# Patient Record
Sex: MALE | Race: WHITE | NOT HISPANIC OR LATINO | Employment: OTHER | URBAN - METROPOLITAN AREA
[De-identification: names, ages, dates, MRNs, and addresses within clinical notes are randomized per-mention and may not be internally consistent; named-entity substitution may affect disease eponyms.]

---

## 2025-02-05 ENCOUNTER — APPOINTMENT (OUTPATIENT)
Dept: RADIOLOGY | Facility: HOSPITAL | Age: 80
End: 2025-02-05
Payer: OTHER GOVERNMENT

## 2025-02-05 ENCOUNTER — HOSPITAL ENCOUNTER (EMERGENCY)
Facility: HOSPITAL | Age: 80
Discharge: HOME | End: 2025-02-05
Attending: EMERGENCY MEDICINE
Payer: OTHER GOVERNMENT

## 2025-02-05 VITALS
TEMPERATURE: 96.6 F | RESPIRATION RATE: 16 BRPM | BODY MASS INDEX: 39.25 KG/M2 | WEIGHT: 265 LBS | HEART RATE: 64 BPM | DIASTOLIC BLOOD PRESSURE: 50 MMHG | SYSTOLIC BLOOD PRESSURE: 147 MMHG | HEIGHT: 69 IN | OXYGEN SATURATION: 99 %

## 2025-02-05 DIAGNOSIS — L03.031 CELLULITIS OF TOE OF RIGHT FOOT: Primary | ICD-10-CM

## 2025-02-05 LAB
ALBUMIN SERPL BCP-MCNC: 3.8 G/DL (ref 3.4–5)
ALP SERPL-CCNC: 56 U/L (ref 33–136)
ALT SERPL W P-5'-P-CCNC: 20 U/L (ref 10–52)
ANION GAP SERPL CALC-SCNC: 11 MMOL/L (ref 10–20)
AST SERPL W P-5'-P-CCNC: 30 U/L (ref 9–39)
BASOPHILS # BLD AUTO: 0.02 X10*3/UL (ref 0–0.1)
BASOPHILS NFR BLD AUTO: 0.3 %
BILIRUB SERPL-MCNC: 0.4 MG/DL (ref 0–1.2)
BUN SERPL-MCNC: 32 MG/DL (ref 6–23)
CALCIUM SERPL-MCNC: 9.2 MG/DL (ref 8.6–10.3)
CHLORIDE SERPL-SCNC: 103 MMOL/L (ref 98–107)
CO2 SERPL-SCNC: 29 MMOL/L (ref 21–32)
CREAT SERPL-MCNC: 0.95 MG/DL (ref 0.5–1.3)
EGFRCR SERPLBLD CKD-EPI 2021: 81 ML/MIN/1.73M*2
EOSINOPHIL # BLD AUTO: 0.16 X10*3/UL (ref 0–0.4)
EOSINOPHIL NFR BLD AUTO: 2.3 %
ERYTHROCYTE [DISTWIDTH] IN BLOOD BY AUTOMATED COUNT: 15.7 % (ref 11.5–14.5)
GLUCOSE SERPL-MCNC: 91 MG/DL (ref 74–99)
HCT VFR BLD AUTO: 34.1 % (ref 41–52)
HGB BLD-MCNC: 10.7 G/DL (ref 13.5–17.5)
IMM GRANULOCYTES # BLD AUTO: 0.01 X10*3/UL (ref 0–0.5)
IMM GRANULOCYTES NFR BLD AUTO: 0.1 % (ref 0–0.9)
LYMPHOCYTES # BLD AUTO: 1.66 X10*3/UL (ref 0.8–3)
LYMPHOCYTES NFR BLD AUTO: 24.3 %
MCH RBC QN AUTO: 28.8 PG (ref 26–34)
MCHC RBC AUTO-ENTMCNC: 31.4 G/DL (ref 32–36)
MCV RBC AUTO: 92 FL (ref 80–100)
MONOCYTES # BLD AUTO: 0.61 X10*3/UL (ref 0.05–0.8)
MONOCYTES NFR BLD AUTO: 8.9 %
NEUTROPHILS # BLD AUTO: 4.36 X10*3/UL (ref 1.6–5.5)
NEUTROPHILS NFR BLD AUTO: 64.1 %
NRBC BLD-RTO: 0 /100 WBCS (ref 0–0)
PLATELET # BLD AUTO: 143 X10*3/UL (ref 150–450)
POTASSIUM SERPL-SCNC: 4.6 MMOL/L (ref 3.5–5.3)
PROT SERPL-MCNC: 6.6 G/DL (ref 6.4–8.2)
RBC # BLD AUTO: 3.72 X10*6/UL (ref 4.5–5.9)
SODIUM SERPL-SCNC: 138 MMOL/L (ref 136–145)
WBC # BLD AUTO: 6.8 X10*3/UL (ref 4.4–11.3)

## 2025-02-05 PROCEDURE — 73620 X-RAY EXAM OF FOOT: CPT | Mod: RT

## 2025-02-05 PROCEDURE — 99285 EMERGENCY DEPT VISIT HI MDM: CPT

## 2025-02-05 PROCEDURE — 2500000001 HC RX 250 WO HCPCS SELF ADMINISTERED DRUGS (ALT 637 FOR MEDICARE OP)

## 2025-02-05 PROCEDURE — 2500000001 HC RX 250 WO HCPCS SELF ADMINISTERED DRUGS (ALT 637 FOR MEDICARE OP): Performed by: EMERGENCY MEDICINE

## 2025-02-05 PROCEDURE — 85025 COMPLETE CBC W/AUTO DIFF WBC: CPT

## 2025-02-05 PROCEDURE — 80053 COMPREHEN METABOLIC PANEL: CPT

## 2025-02-05 PROCEDURE — 99284 EMERGENCY DEPT VISIT MOD MDM: CPT | Performed by: EMERGENCY MEDICINE

## 2025-02-05 PROCEDURE — 36415 COLL VENOUS BLD VENIPUNCTURE: CPT

## 2025-02-05 RX ORDER — DOXYCYCLINE 100 MG/1
100 CAPSULE ORAL ONCE
Status: COMPLETED | OUTPATIENT
Start: 2025-02-05 | End: 2025-02-05

## 2025-02-05 RX ORDER — SULFAMETHOXAZOLE AND TRIMETHOPRIM 800; 160 MG/1; MG/1
1 TABLET ORAL ONCE
Status: DISCONTINUED | OUTPATIENT
Start: 2025-02-05 | End: 2025-02-05

## 2025-02-05 RX ORDER — CEPHALEXIN 500 MG/1
500 CAPSULE ORAL 3 TIMES DAILY
Qty: 30 CAPSULE | Refills: 0 | Status: SHIPPED | OUTPATIENT
Start: 2025-02-05 | End: 2025-02-15

## 2025-02-05 RX ORDER — CIPROFLOXACIN 500 MG/1
750 TABLET ORAL ONCE
Status: DISCONTINUED | OUTPATIENT
Start: 2025-02-05 | End: 2025-02-05

## 2025-02-05 RX ORDER — CIPROFLOXACIN 750 MG/1
750 TABLET, FILM COATED ORAL 2 TIMES DAILY
Qty: 20 TABLET | Refills: 0 | Status: SHIPPED | OUTPATIENT
Start: 2025-02-05 | End: 2025-02-05 | Stop reason: WASHOUT

## 2025-02-05 RX ORDER — CEPHALEXIN 500 MG/1
500 CAPSULE ORAL ONCE
Status: COMPLETED | OUTPATIENT
Start: 2025-02-05 | End: 2025-02-05

## 2025-02-05 RX ORDER — DOXYCYCLINE 100 MG/1
100 CAPSULE ORAL 2 TIMES DAILY
Qty: 20 CAPSULE | Refills: 0 | Status: SHIPPED | OUTPATIENT
Start: 2025-02-05 | End: 2025-02-15

## 2025-02-05 RX ADMIN — CEPHALEXIN 500 MG: 500 CAPSULE ORAL at 14:42

## 2025-02-05 RX ADMIN — DOXYCYCLINE HYCLATE 100 MG: 100 CAPSULE ORAL at 14:42

## 2025-02-05 ASSESSMENT — LIFESTYLE VARIABLES
EVER HAD A DRINK FIRST THING IN THE MORNING TO STEADY YOUR NERVES TO GET RID OF A HANGOVER: NO
EVER FELT BAD OR GUILTY ABOUT YOUR DRINKING: NO
HAVE YOU EVER FELT YOU SHOULD CUT DOWN ON YOUR DRINKING: NO
TOTAL SCORE: 0
HAVE PEOPLE ANNOYED YOU BY CRITICIZING YOUR DRINKING: NO

## 2025-02-05 ASSESSMENT — PAIN - FUNCTIONAL ASSESSMENT
PAIN_FUNCTIONAL_ASSESSMENT: 0-10
PAIN_FUNCTIONAL_ASSESSMENT: 0-10

## 2025-02-05 ASSESSMENT — COLUMBIA-SUICIDE SEVERITY RATING SCALE - C-SSRS
1. IN THE PAST MONTH, HAVE YOU WISHED YOU WERE DEAD OR WISHED YOU COULD GO TO SLEEP AND NOT WAKE UP?: NO
6. HAVE YOU EVER DONE ANYTHING, STARTED TO DO ANYTHING, OR PREPARED TO DO ANYTHING TO END YOUR LIFE?: NO
2. HAVE YOU ACTUALLY HAD ANY THOUGHTS OF KILLING YOURSELF?: NO

## 2025-02-05 ASSESSMENT — PAIN SCALES - GENERAL
PAINLEVEL_OUTOF10: 4
PAINLEVEL_OUTOF10: 3
PAINLEVEL_OUTOF10: 3

## 2025-02-05 ASSESSMENT — PAIN DESCRIPTION - ORIENTATION: ORIENTATION: RIGHT

## 2025-02-05 ASSESSMENT — PAIN DESCRIPTION - LOCATION: LOCATION: TOE (COMMENT WHICH ONE)

## 2025-02-05 NOTE — ED PROVIDER NOTES
EMERGENCY DEPARTMENT ENCOUNTER      Pt Name: Sanya Crowley  MRN: [id number]  Birthdate 1945  Date of evaluation: 2/5/2025  Provider: Aki Lloyd DO    CHIEF COMPLAINT       Chief Complaint   Patient presents with    Wound Check     R 2nd toe; injured 2 weeks ago from rubbing on boot when shoveling snow          HISTORY OF PRESENT ILLNESS    Patient is a 79-year-old male with a PMH of DM and HLD  presenting to the ED due to swelling and tenderness of his right second digit.  States that 2 weeks ago he was shoveling snow and his toe was rubbing against the top of his boot and it created an abrasion.  He put a Band-Aid on it and did not have any further issues.  Then about 2 days ago he noticed that it was getting a little bit more red than it had been previously.  And last night  it became even more erythematous and was painful when the top sheet touched it while he was in bed.  It had previously not been painful at all.  He denies any headache, chest pain, shortness of breath, fever, or chills.  He has not tried anything for the pain.      History provided by:  Patient   used: No        Nursing Notes were reviewed.    PAST MEDICAL HISTORY   History reviewed. No pertinent past medical history.      SURGICAL HISTORY     History reviewed. No pertinent surgical history.      CURRENT MEDICATIONS       Discharge Medication List as of 2/5/2025  2:37 PM          ALLERGIES     Patient has no known allergies.    FAMILY HISTORY     No family history on file.       SOCIAL HISTORY       Social History     Socioeconomic History    Marital status:    Tobacco Use    Smoking status: Never    Smokeless tobacco: Never   Substance and Sexual Activity    Drug use: Never       SCREENINGS                        PHYSICAL EXAM    (up to 7 for level 4, 8 or more for level 5)     ED Triage Vitals   Temperature Heart Rate Respirations BP   02/05/25 1037 02/05/25 1037 02/05/25 1037 02/05/25 1037   35.9 °C (96.6  °F) 60 18 171/72      Pulse Ox Temp Source Heart Rate Source Patient Position   02/05/25 1037 02/05/25 1037 02/05/25 1226 02/05/25 1226   100 % Temporal Monitor Sitting      BP Location FiO2 (%)     02/05/25 1226 --     Right arm        Physical Exam  Vitals reviewed.   Constitutional:       General: He is not in acute distress.     Appearance: Normal appearance. He is not ill-appearing or toxic-appearing.   HENT:      Head: Normocephalic and atraumatic.   Cardiovascular:      Rate and Rhythm: Normal rate.   Pulmonary:      Effort: Pulmonary effort is normal.   Abdominal:      General: Abdomen is flat.   Musculoskeletal:      Right lower leg: No edema.      Left lower leg: No edema.      Right foot: Normal range of motion and normal capillary refill. Swelling present. Normal pulse.      Left foot: Normal.      Comments: 2 R digit diffusely erythematous and swollen. 1 cm scab at DIP joint    Skin:     General: Skin is warm and dry.      Capillary Refill: Capillary refill takes less than 2 seconds.   Neurological:      General: No focal deficit present.      Mental Status: He is alert.   Psychiatric:         Mood and Affect: Mood normal.         Behavior: Behavior normal.          DIAGNOSTIC RESULTS     LABS:  Labs Reviewed   CBC WITH AUTO DIFFERENTIAL - Abnormal       Result Value    WBC 6.8      nRBC 0.0      RBC 3.72 (*)     Hemoglobin 10.7 (*)     Hematocrit 34.1 (*)     MCV 92      MCH 28.8      MCHC 31.4 (*)     RDW 15.7 (*)     Platelets 143 (*)     Neutrophils % 64.1      Immature Granulocytes %, Automated 0.1      Lymphocytes % 24.3      Monocytes % 8.9      Eosinophils % 2.3      Basophils % 0.3      Neutrophils Absolute 4.36      Immature Granulocytes Absolute, Automated 0.01      Lymphocytes Absolute 1.66      Monocytes Absolute 0.61      Eosinophils Absolute 0.16      Basophils Absolute 0.02     COMPREHENSIVE METABOLIC PANEL - Abnormal    Glucose 91      Sodium 138      Potassium 4.6      Chloride 103       Bicarbonate 29      Anion Gap 11      Urea Nitrogen 32 (*)     Creatinine 0.95      eGFR 81      Calcium 9.2      Albumin 3.8      Alkaline Phosphatase 56      Total Protein 6.6      AST 30      Bilirubin, Total 0.4      ALT 20         All other labs were within normal range or not returned as of this dictation.    Imaging  XR foot right 1-2 views   Final Result   No acute fracture or dislocation of the right foot is noted.             MACRO:   None        Signed by: Juancarlos Phillips 2/5/2025 1:05 PM   Dictation workstation:   [id number]           Procedures  Please see separate procedure documentation for further details.     EMERGENCY DEPARTMENT COURSE/MDM:   Medical Decision Making  Patient is a 79-year-old man with a PMH of DM and HLD that presents to the ED with erythema and swelling to the right second digit.  He is hemodynamically stable and vital signs within normal limits.  Stated that about 2 weeks ago when he was shoveling snow his toe was rubbing against the top of his boot which caused it to bleed.  He covered with a Band-Aid and did not have any further issues with it.  2 days ago he noticed that it was getting more red and yesterday it became painful.  He denies any fevers or chills.  On exam he appears generally well.  Pulses are intact.  Cap refill is less than 2 seconds.  Full strength and range of motion to the right foot.  No pain with range of motion. Foot x-ray does not show any signs of infection, fracture, or dislocation.  CBC does not show any signs of leukocytosis making any sort of systemic infection unlikely. CMP did not show any electrolyte related abnormality or any acute kidney injury.  He does not need admission at this time and a local cellulitis can be treated with outpatient antibiotics.  He will be given a dose of Keflex and doxycycline here in the ED. he was sent home with a prescription for the same antibiotic.  Discharge and return precautions were discussed with the patient  and he was agreeable to the plan.    I have seen and evaluated the patient and agree with the medical student's documentation as above.  I have edited and made adjustments as needed.  Plan of care was discussed with the attending physician.    Please see ED course for the rest of the MDM.    Aki Lloyd DO, PGY-3  Emergency Medicine    Amount and/or Complexity of Data Reviewed  External Data Reviewed:      Details: [facility] that showed [facility] dx of DM and HLD  Labs: ordered. Decision-making details documented in ED Course.  Radiology: ordered. Decision-making details documented in ED Course.        Please see ED course for additional MDM.    ED Course as of 02/05/25 1607   Wed Feb 05, 2025   1317 X-ray of the foot is unremarkable. [CL]   1317 CBC overall unremarkable.  No evidence of leukocytosis. [CL]      ED Course User Index  [CL] Aki Lloyd DO         Diagnoses as of 02/05/25 1607   Cellulitis of toe of right foot        XR foot right 1-2 views   Final Result   No acute fracture or dislocation of the right foot is noted.             MACRO:   None        Signed by: Juancarlos Phillips 2/5/2025 1:05 PM   Dictation workstation:   [id number]          Patient and or family in agreement and understanding of treatment plan.  All questions answered.      I reviewed the case with the attending ED physician. The attending ED physician agrees with the plan. Patient and/or patient´s representative was counseled regarding labs, imaging, likely diagnosis, and plan. All questions were answered.    ED Medications administered this visit:    Medications   cephalexin (Keflex) capsule 500 mg (500 mg oral Given 2/5/25 1442)   doxycycline (Vibramycin) capsule 100 mg (100 mg oral Given 2/5/25 1442)       New Prescriptions from this visit:    Discharge Medication List as of 2/5/2025  2:37 PM        START taking these medications    Details   cephalexin (Keflex) 500 mg capsule Take 1 capsule (500 mg) by mouth 3 times a  day for 10 days., Starting Wed 2/5/2025, Until Sat 2/15/2025, Normal      doxycycline (Vibramycin) 100 mg capsule Take 1 capsule (100 mg) by mouth 2 times a day for 10 days. Take with at least 8 ounces (large glass) of water, do not lie down for 30 minutes after, Starting Wed 2/5/2025, Until Sat 2/15/2025, Normal             Follow-up:  [facility]  [address] [address] [address] [facility] [address] [facility] [address] [phone]            Final Impression:   1. Cellulitis of toe of right foot          (Please note that portions of this note were completed with a voice recognition program.  Efforts were made to edit the dictations but occasionally words are mis-transcribed.)       Aki Lloyd,   Resident  02/05/25 [id number]

## 2025-02-05 NOTE — DISCHARGE INSTRUCTIONS
You have been evaluated in the Emergency Department today for a skin infection. If the area of inflammation was outlined today in the ER, please return to the ER immediately if the area of redness increases beyond that border. Please take your prescribed antibiotics as directed for the full course of the medication.    We recommend you take 600mg ibuprofen every 6 hours or tylenol 650mg every 6 hours as needed for pain. If needed, you can alternate these medications so that you take one medication every 3 hours. For instance, at noon take ibuprofen, then at 3pm take tylenol, then at 6pm take ibuprofen.    Please schedule an appointment for follow up with your primary care physician as soon as possible.    Return to the Emergency Department if you experience recurrent vomiting, fevers greater than 100.4F, increase in area of redness, warmth around the area, foul smelling discharge from the area, increased tenderness around the area, or any other concerning symptoms.    Thank you for choosing us for your care. yue

## 2025-02-25 ENCOUNTER — HOSPITAL ENCOUNTER (INPATIENT)
Facility: HOSPITAL | Age: 80
LOS: 2 days | Discharge: HOME | End: 2025-02-27
Attending: EMERGENCY MEDICINE | Admitting: HOSPITALIST
Payer: MEDICARE

## 2025-02-25 ENCOUNTER — APPOINTMENT (OUTPATIENT)
Dept: RADIOLOGY | Facility: HOSPITAL | Age: 80
End: 2025-02-25
Payer: MEDICARE

## 2025-02-25 ENCOUNTER — APPOINTMENT (OUTPATIENT)
Dept: CARDIOLOGY | Facility: HOSPITAL | Age: 80
End: 2025-02-25
Payer: MEDICARE

## 2025-02-25 DIAGNOSIS — R42 DIZZINESS: ICD-10-CM

## 2025-02-25 DIAGNOSIS — R00.1 BRADYCARDIA: ICD-10-CM

## 2025-02-25 DIAGNOSIS — R47.81 SLURRED SPEECH: ICD-10-CM

## 2025-02-25 DIAGNOSIS — L03.031 CELLULITIS OF TOE OF RIGHT FOOT: ICD-10-CM

## 2025-02-25 DIAGNOSIS — Z95.0 PACEMAKER: ICD-10-CM

## 2025-02-25 DIAGNOSIS — R55 POSTURAL DIZZINESS WITH NEAR SYNCOPE: ICD-10-CM

## 2025-02-25 DIAGNOSIS — I44.1 AV BLOCK, MOBITZ 2: ICD-10-CM

## 2025-02-25 DIAGNOSIS — R42 POSTURAL DIZZINESS WITH NEAR SYNCOPE: ICD-10-CM

## 2025-02-25 DIAGNOSIS — I49.9 ARRHYTHMIA: Primary | ICD-10-CM

## 2025-02-25 PROBLEM — E78.5 HYPERLIPIDEMIA: Status: ACTIVE | Noted: 2025-02-25

## 2025-02-25 PROBLEM — I87.2 CHRONIC VENOUS INSUFFICIENCY: Status: ACTIVE | Noted: 2025-02-25

## 2025-02-25 PROBLEM — E11.9 DIABETES TYPE 2, CONTROLLED (MULTI): Status: ACTIVE | Noted: 2025-02-25

## 2025-02-25 PROBLEM — E66.01 MORBID OBESITY (MULTI): Status: ACTIVE | Noted: 2025-02-25

## 2025-02-25 LAB
ALBUMIN SERPL BCP-MCNC: 3.6 G/DL (ref 3.4–5)
ALP SERPL-CCNC: 67 U/L (ref 33–136)
ALT SERPL W P-5'-P-CCNC: 21 U/L (ref 10–52)
ANION GAP SERPL CALC-SCNC: 10 MMOL/L (ref 10–20)
APTT PPP: 34 SECONDS (ref 26–36)
AST SERPL W P-5'-P-CCNC: 28 U/L (ref 9–39)
BASOPHILS # BLD AUTO: 0.02 X10*3/UL (ref 0–0.1)
BASOPHILS NFR BLD AUTO: 0.4 %
BILIRUB SERPL-MCNC: 0.4 MG/DL (ref 0–1.2)
BNP SERPL-MCNC: 123 PG/ML (ref 0–99)
BUN SERPL-MCNC: 36 MG/DL (ref 6–23)
CALCIUM SERPL-MCNC: 9.2 MG/DL (ref 8.6–10.3)
CARDIAC TROPONIN I PNL SERPL HS: 4 NG/L (ref 0–20)
CARDIAC TROPONIN I PNL SERPL HS: 4 NG/L (ref 0–20)
CHLORIDE SERPL-SCNC: 105 MMOL/L (ref 98–107)
CO2 SERPL-SCNC: 29 MMOL/L (ref 21–32)
CREAT SERPL-MCNC: 0.98 MG/DL (ref 0.5–1.3)
D DIMER PPP FEU-MCNC: 552 NG/ML FEU
EGFRCR SERPLBLD CKD-EPI 2021: 78 ML/MIN/1.73M*2
EOSINOPHIL # BLD AUTO: 0.18 X10*3/UL (ref 0–0.4)
EOSINOPHIL NFR BLD AUTO: 3.5 %
ERYTHROCYTE [DISTWIDTH] IN BLOOD BY AUTOMATED COUNT: 15.5 % (ref 11.5–14.5)
FLUAV RNA RESP QL NAA+PROBE: NOT DETECTED
FLUBV RNA RESP QL NAA+PROBE: NOT DETECTED
GLUCOSE BLD MANUAL STRIP-MCNC: 106 MG/DL (ref 74–99)
GLUCOSE SERPL-MCNC: 108 MG/DL (ref 74–99)
HCT VFR BLD AUTO: 34.3 % (ref 41–52)
HGB BLD-MCNC: 10.9 G/DL (ref 13.5–17.5)
IMM GRANULOCYTES # BLD AUTO: 0.01 X10*3/UL (ref 0–0.5)
IMM GRANULOCYTES NFR BLD AUTO: 0.2 % (ref 0–0.9)
INR PPP: 1.1 (ref 0.9–1.1)
LACTATE SERPL-SCNC: 0.6 MMOL/L (ref 0.4–2)
LYMPHOCYTES # BLD AUTO: 1.8 X10*3/UL (ref 0.8–3)
LYMPHOCYTES NFR BLD AUTO: 35 %
MAGNESIUM SERPL-MCNC: 1.89 MG/DL (ref 1.6–2.4)
MCH RBC QN AUTO: 29.1 PG (ref 26–34)
MCHC RBC AUTO-ENTMCNC: 31.8 G/DL (ref 32–36)
MCV RBC AUTO: 92 FL (ref 80–100)
MONOCYTES # BLD AUTO: 0.51 X10*3/UL (ref 0.05–0.8)
MONOCYTES NFR BLD AUTO: 9.9 %
NEUTROPHILS # BLD AUTO: 2.63 X10*3/UL (ref 1.6–5.5)
NEUTROPHILS NFR BLD AUTO: 51 %
NRBC BLD-RTO: 0 /100 WBCS (ref 0–0)
PLATELET # BLD AUTO: 135 X10*3/UL (ref 150–450)
POTASSIUM SERPL-SCNC: 4.4 MMOL/L (ref 3.5–5.3)
PROT SERPL-MCNC: 6.6 G/DL (ref 6.4–8.2)
PROTHROMBIN TIME: 11.7 SECONDS (ref 9.8–12.4)
RBC # BLD AUTO: 3.75 X10*6/UL (ref 4.5–5.9)
SARS-COV-2 RNA RESP QL NAA+PROBE: NOT DETECTED
SODIUM SERPL-SCNC: 140 MMOL/L (ref 136–145)
WBC # BLD AUTO: 5.2 X10*3/UL (ref 4.4–11.3)

## 2025-02-25 PROCEDURE — 2500000001 HC RX 250 WO HCPCS SELF ADMINISTERED DRUGS (ALT 637 FOR MEDICARE OP): Performed by: HOSPITALIST

## 2025-02-25 PROCEDURE — 2060000001 HC INTERMEDIATE ICU ROOM DAILY

## 2025-02-25 PROCEDURE — 83605 ASSAY OF LACTIC ACID: CPT | Performed by: EMERGENCY MEDICINE

## 2025-02-25 PROCEDURE — 99291 CRITICAL CARE FIRST HOUR: CPT | Performed by: EMERGENCY MEDICINE

## 2025-02-25 PROCEDURE — 85730 THROMBOPLASTIN TIME PARTIAL: CPT | Performed by: EMERGENCY MEDICINE

## 2025-02-25 PROCEDURE — 84075 ASSAY ALKALINE PHOSPHATASE: CPT | Performed by: EMERGENCY MEDICINE

## 2025-02-25 PROCEDURE — 83880 ASSAY OF NATRIURETIC PEPTIDE: CPT | Performed by: EMERGENCY MEDICINE

## 2025-02-25 PROCEDURE — 70450 CT HEAD/BRAIN W/O DYE: CPT

## 2025-02-25 PROCEDURE — 85025 COMPLETE CBC W/AUTO DIFF WBC: CPT | Performed by: EMERGENCY MEDICINE

## 2025-02-25 PROCEDURE — 99223 1ST HOSP IP/OBS HIGH 75: CPT | Performed by: HOSPITALIST

## 2025-02-25 PROCEDURE — 93005 ELECTROCARDIOGRAM TRACING: CPT

## 2025-02-25 PROCEDURE — 2500000004 HC RX 250 GENERAL PHARMACY W/ HCPCS (ALT 636 FOR OP/ED): Performed by: EMERGENCY MEDICINE

## 2025-02-25 PROCEDURE — 85379 FIBRIN DEGRADATION QUANT: CPT | Performed by: EMERGENCY MEDICINE

## 2025-02-25 PROCEDURE — 87636 SARSCOV2 & INF A&B AMP PRB: CPT | Performed by: EMERGENCY MEDICINE

## 2025-02-25 PROCEDURE — 82947 ASSAY GLUCOSE BLOOD QUANT: CPT

## 2025-02-25 PROCEDURE — 84484 ASSAY OF TROPONIN QUANT: CPT | Performed by: EMERGENCY MEDICINE

## 2025-02-25 PROCEDURE — 36415 COLL VENOUS BLD VENIPUNCTURE: CPT | Performed by: EMERGENCY MEDICINE

## 2025-02-25 PROCEDURE — 85610 PROTHROMBIN TIME: CPT | Performed by: EMERGENCY MEDICINE

## 2025-02-25 PROCEDURE — 80053 COMPREHEN METABOLIC PANEL: CPT | Performed by: EMERGENCY MEDICINE

## 2025-02-25 PROCEDURE — 99285 EMERGENCY DEPT VISIT HI MDM: CPT | Mod: 25 | Performed by: EMERGENCY MEDICINE

## 2025-02-25 PROCEDURE — 83735 ASSAY OF MAGNESIUM: CPT | Performed by: EMERGENCY MEDICINE

## 2025-02-25 PROCEDURE — 71045 X-RAY EXAM CHEST 1 VIEW: CPT

## 2025-02-25 PROCEDURE — 70450 CT HEAD/BRAIN W/O DYE: CPT | Performed by: STUDENT IN AN ORGANIZED HEALTH CARE EDUCATION/TRAINING PROGRAM

## 2025-02-25 PROCEDURE — 71045 X-RAY EXAM CHEST 1 VIEW: CPT | Performed by: RADIOLOGY

## 2025-02-25 RX ORDER — ACETAMINOPHEN 500 MG
5 TABLET ORAL NIGHTLY PRN
Status: DISCONTINUED | OUTPATIENT
Start: 2025-02-25 | End: 2025-02-27 | Stop reason: HOSPADM

## 2025-02-25 RX ORDER — POLYETHYLENE GLYCOL 3350 17 G/17G
17 POWDER, FOR SOLUTION ORAL DAILY
Status: DISCONTINUED | OUTPATIENT
Start: 2025-02-25 | End: 2025-02-27 | Stop reason: HOSPADM

## 2025-02-25 RX ORDER — CEPHALEXIN 500 MG/1
500 CAPSULE ORAL 4 TIMES DAILY
COMMUNITY
Start: 2021-06-17

## 2025-02-25 RX ORDER — ONDANSETRON 4 MG/1
4 TABLET, FILM COATED ORAL EVERY 8 HOURS PRN
Status: DISCONTINUED | OUTPATIENT
Start: 2025-02-25 | End: 2025-02-27 | Stop reason: HOSPADM

## 2025-02-25 RX ORDER — INSULIN LISPRO 100 [IU]/ML
0-10 INJECTION, SOLUTION INTRAVENOUS; SUBCUTANEOUS
Status: DISCONTINUED | OUTPATIENT
Start: 2025-02-26 | End: 2025-02-27 | Stop reason: HOSPADM

## 2025-02-25 RX ORDER — ACETAMINOPHEN 325 MG/1
650 TABLET ORAL EVERY 4 HOURS PRN
Status: DISCONTINUED | OUTPATIENT
Start: 2025-02-25 | End: 2025-02-27 | Stop reason: HOSPADM

## 2025-02-25 RX ORDER — ONDANSETRON HYDROCHLORIDE 2 MG/ML
4 INJECTION, SOLUTION INTRAVENOUS EVERY 8 HOURS PRN
Status: DISCONTINUED | OUTPATIENT
Start: 2025-02-25 | End: 2025-02-27 | Stop reason: HOSPADM

## 2025-02-25 RX ORDER — ACETAMINOPHEN 650 MG/1
650 SUPPOSITORY RECTAL EVERY 4 HOURS PRN
Status: DISCONTINUED | OUTPATIENT
Start: 2025-02-25 | End: 2025-02-27 | Stop reason: HOSPADM

## 2025-02-25 RX ORDER — DOXYCYCLINE 100 MG/1
100 TABLET ORAL 2 TIMES DAILY
COMMUNITY

## 2025-02-25 RX ORDER — ENOXAPARIN SODIUM 100 MG/ML
40 INJECTION SUBCUTANEOUS EVERY 12 HOURS SCHEDULED
Status: DISCONTINUED | OUTPATIENT
Start: 2025-02-25 | End: 2025-02-27 | Stop reason: HOSPADM

## 2025-02-25 RX ORDER — ACETAMINOPHEN 160 MG/5ML
650 SOLUTION ORAL EVERY 4 HOURS PRN
Status: DISCONTINUED | OUTPATIENT
Start: 2025-02-25 | End: 2025-02-27 | Stop reason: HOSPADM

## 2025-02-25 RX ADMIN — Medication 5 MG: at 23:01

## 2025-02-25 RX ADMIN — SODIUM CHLORIDE 500 ML: 9 INJECTION, SOLUTION INTRAVENOUS at 16:36

## 2025-02-25 SDOH — SOCIAL STABILITY: SOCIAL INSECURITY: WITHIN THE LAST YEAR, HAVE YOU BEEN HUMILIATED OR EMOTIONALLY ABUSED IN OTHER WAYS BY YOUR PARTNER OR EX-PARTNER?: NO

## 2025-02-25 SDOH — ECONOMIC STABILITY: FOOD INSECURITY: WITHIN THE PAST 12 MONTHS, YOU WORRIED THAT YOUR FOOD WOULD RUN OUT BEFORE YOU GOT THE MONEY TO BUY MORE.: NEVER TRUE

## 2025-02-25 SDOH — ECONOMIC STABILITY: FOOD INSECURITY: WITHIN THE PAST 12 MONTHS, THE FOOD YOU BOUGHT JUST DIDN'T LAST AND YOU DIDN'T HAVE MONEY TO GET MORE.: NEVER TRUE

## 2025-02-25 SDOH — SOCIAL STABILITY: SOCIAL INSECURITY
WITHIN THE LAST YEAR, HAVE YOU BEEN RAPED OR FORCED TO HAVE ANY KIND OF SEXUAL ACTIVITY BY YOUR PARTNER OR EX-PARTNER?: NO

## 2025-02-25 SDOH — SOCIAL STABILITY: SOCIAL INSECURITY: HAVE YOU HAD ANY THOUGHTS OF HARMING ANYONE ELSE?: NO

## 2025-02-25 SDOH — SOCIAL STABILITY: SOCIAL INSECURITY
WITHIN THE LAST YEAR, HAVE YOU BEEN KICKED, HIT, SLAPPED, OR OTHERWISE PHYSICALLY HURT BY YOUR PARTNER OR EX-PARTNER?: NO

## 2025-02-25 SDOH — SOCIAL STABILITY: SOCIAL INSECURITY: WERE YOU ABLE TO COMPLETE ALL THE BEHAVIORAL HEALTH SCREENINGS?: YES

## 2025-02-25 SDOH — SOCIAL STABILITY: SOCIAL INSECURITY: ARE YOU OR HAVE YOU BEEN THREATENED OR ABUSED PHYSICALLY, EMOTIONALLY, OR SEXUALLY BY ANYONE?: NO

## 2025-02-25 SDOH — SOCIAL STABILITY: SOCIAL INSECURITY: DO YOU FEEL UNSAFE GOING BACK TO THE PLACE WHERE YOU ARE LIVING?: NO

## 2025-02-25 SDOH — ECONOMIC STABILITY: INCOME INSECURITY: IN THE PAST 12 MONTHS HAS THE ELECTRIC, GAS, OIL, OR WATER COMPANY THREATENED TO SHUT OFF SERVICES IN YOUR HOME?: NO

## 2025-02-25 SDOH — SOCIAL STABILITY: SOCIAL INSECURITY: WITHIN THE LAST YEAR, HAVE YOU BEEN AFRAID OF YOUR PARTNER OR EX-PARTNER?: NO

## 2025-02-25 SDOH — SOCIAL STABILITY: SOCIAL INSECURITY: ABUSE: ADULT

## 2025-02-25 SDOH — SOCIAL STABILITY: SOCIAL INSECURITY: ARE THERE ANY APPARENT SIGNS OF INJURIES/BEHAVIORS THAT COULD BE RELATED TO ABUSE/NEGLECT?: NO

## 2025-02-25 SDOH — SOCIAL STABILITY: SOCIAL INSECURITY: DO YOU FEEL ANYONE HAS EXPLOITED OR TAKEN ADVANTAGE OF YOU FINANCIALLY OR OF YOUR PERSONAL PROPERTY?: NO

## 2025-02-25 SDOH — SOCIAL STABILITY: SOCIAL INSECURITY: DOES ANYONE TRY TO KEEP YOU FROM HAVING/CONTACTING OTHER FRIENDS OR DOING THINGS OUTSIDE YOUR HOME?: NO

## 2025-02-25 SDOH — SOCIAL STABILITY: SOCIAL INSECURITY: HAVE YOU HAD THOUGHTS OF HARMING ANYONE ELSE?: NO

## 2025-02-25 SDOH — SOCIAL STABILITY: SOCIAL INSECURITY: HAS ANYONE EVER THREATENED TO HURT YOUR FAMILY OR YOUR PETS?: NO

## 2025-02-25 ASSESSMENT — LIFESTYLE VARIABLES
TOTAL SCORE: 0
HOW OFTEN DO YOU HAVE 6 OR MORE DRINKS ON ONE OCCASION: NEVER
HOW MANY STANDARD DRINKS CONTAINING ALCOHOL DO YOU HAVE ON A TYPICAL DAY: PATIENT DOES NOT DRINK
SKIP TO QUESTIONS 9-10: 1
EVER FELT BAD OR GUILTY ABOUT YOUR DRINKING: NO
AUDIT-C TOTAL SCORE: 0
HAVE YOU EVER FELT YOU SHOULD CUT DOWN ON YOUR DRINKING: NO
EVER HAD A DRINK FIRST THING IN THE MORNING TO STEADY YOUR NERVES TO GET RID OF A HANGOVER: NO
AUDIT-C TOTAL SCORE: 0
HAVE PEOPLE ANNOYED YOU BY CRITICIZING YOUR DRINKING: NO
HOW OFTEN DO YOU HAVE A DRINK CONTAINING ALCOHOL: NEVER

## 2025-02-25 ASSESSMENT — ACTIVITIES OF DAILY LIVING (ADL)
PATIENT'S MEMORY ADEQUATE TO SAFELY COMPLETE DAILY ACTIVITIES?: YES
WALKS IN HOME: INDEPENDENT
FEEDING YOURSELF: INDEPENDENT
ADEQUATE_TO_COMPLETE_ADL: YES
GROOMING: INDEPENDENT
HEARING - RIGHT EAR: FUNCTIONAL
BATHING: INDEPENDENT
DRESSING YOURSELF: NEEDS ASSISTANCE
TOILETING: INDEPENDENT
JUDGMENT_ADEQUATE_SAFELY_COMPLETE_DAILY_ACTIVITIES: YES
LACK_OF_TRANSPORTATION: NO
HEARING - LEFT EAR: FUNCTIONAL

## 2025-02-25 ASSESSMENT — COGNITIVE AND FUNCTIONAL STATUS - GENERAL
WALKING IN HOSPITAL ROOM: A LITTLE
PATIENT BASELINE BEDBOUND: NO
MOBILITY SCORE: 18
CLIMB 3 TO 5 STEPS WITH RAILING: A LITTLE
MOVING FROM LYING ON BACK TO SITTING ON SIDE OF FLAT BED WITH BEDRAILS: A LITTLE
DRESSING REGULAR LOWER BODY CLOTHING: A LITTLE
TURNING FROM BACK TO SIDE WHILE IN FLAT BAD: A LITTLE
DRESSING REGULAR UPPER BODY CLOTHING: A LITTLE
STANDING UP FROM CHAIR USING ARMS: A LITTLE
DAILY ACTIVITIY SCORE: 22
MOVING TO AND FROM BED TO CHAIR: A LITTLE

## 2025-02-25 ASSESSMENT — PATIENT HEALTH QUESTIONNAIRE - PHQ9
2. FEELING DOWN, DEPRESSED OR HOPELESS: NOT AT ALL
SUM OF ALL RESPONSES TO PHQ9 QUESTIONS 1 & 2: 0
1. LITTLE INTEREST OR PLEASURE IN DOING THINGS: NOT AT ALL

## 2025-02-25 ASSESSMENT — COLUMBIA-SUICIDE SEVERITY RATING SCALE - C-SSRS
1. IN THE PAST MONTH, HAVE YOU WISHED YOU WERE DEAD OR WISHED YOU COULD GO TO SLEEP AND NOT WAKE UP?: NO
2. HAVE YOU ACTUALLY HAD ANY THOUGHTS OF KILLING YOURSELF?: NO
6. HAVE YOU EVER DONE ANYTHING, STARTED TO DO ANYTHING, OR PREPARED TO DO ANYTHING TO END YOUR LIFE?: NO

## 2025-02-25 ASSESSMENT — PAIN SCALES - GENERAL
PAINLEVEL_OUTOF10: 0 - NO PAIN
PAINLEVEL_OUTOF10: 0 - NO PAIN

## 2025-02-25 ASSESSMENT — PAIN - FUNCTIONAL ASSESSMENT: PAIN_FUNCTIONAL_ASSESSMENT: 0-10

## 2025-02-25 NOTE — Clinical Note
Sheath was exchanged in the left cephalic vein with INTRODUCER, HYDROPHILIC, PRELUDE SNAP, 6 FR X 13 CM, GREEN. Over glidewire

## 2025-02-25 NOTE — Clinical Note
Updated wife per Dr. 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

## 2025-02-25 NOTE — Clinical Note
A venogram was performed on the right subclavian. Injected with hand injection. Via right peripheral IV

## 2025-02-25 NOTE — H&P
Chief complaint : Dizziness    history Of Present Illness  Sanya Crowley is a 79 y.o. male with history of morbid obesity, diabetes type 2, hyperlipidemia, chronic venous insufficiency, with right second toe wound who presented to emergency room with dizziness and some slurred speech.  Patient was seen at his PCP office today.  Patient was sent by his PCP for dizziness and they noticed the he has some slurred speech.  He was feeling off balance.  He did not have any upper extremity or lower extremity weakness.  He denies having headache.  His vision was a little blurry.  No syncopal episode.  No seizure activities.  No numbness tingling upper extremity or lower extremity.  No facial droops.  No previous history of stroke.  No history of coronary disease.  No history of stroke.  No history of coronary artery disease.  No history of sleep apnea.  He denies having nausea vomiting.  No chest pain.  No shortness of breath.  No orthopnea or PND.  He has been having a bilateral leg edema.  He denies having dysuria, hematuria, or frequency.       Past Medical History  Morbid obesity   HPL   DMII   Chronic venous insufficiency   Cellulitis   Foot culture      Surgical History  He has no past surgical history on file.     Social History  He reports that he has never smoked. He has never used smokeless tobacco. He reports that he does not use drugs. No history on file for alcohol use.    Family History  Noncontributory     Allergies  Patient has no known allergies.    Review of Systems   All other systems reviewed and are negative.       Physical Exam  Constitutional:       Appearance: Normal appearance.   HENT:      Head: Normocephalic and atraumatic.      Mouth/Throat:      Mouth: Mucous membranes are moist.   Eyes:      Extraocular Movements: Extraocular movements intact.      Pupils: Pupils are equal, round, and reactive to light.   Cardiovascular:      Rate and Rhythm: Normal rate and regular rhythm.   Pulmonary:       Effort: Pulmonary effort is normal.      Breath sounds: Normal breath sounds.   Abdominal:      General: Abdomen is flat.      Palpations: Abdomen is soft.   Musculoskeletal:      Cervical back: Normal range of motion and neck supple.   Skin:     General: Skin is warm.   Neurological:      General: No focal deficit present.      Mental Status: He is alert and oriented to person, place, and time.   Psychiatric:         Mood and Affect: Mood normal.         Behavior: Behavior normal.          Last Recorded Vitals  /65   Pulse (!) 42   Temp 36 °C (96.8 °F) (Temporal)   Resp 18   Wt 121 kg (267 lb)   SpO2 98%          Assessment/Plan     Sanya Crowley is a 79 y.o. male with history of morbid obesity, diabetes type 2, hyperlipidemia, chronic venous insufficiency, with right second toe wound who presented to emergency room with dizziness and some slurred speech.  Patient was seen at his PCP office today.  Patient was sent by his PCP for dizziness and they noticed the he has some slurred speech.  Assessment & Plan  Arrhythmia    AV block, Mobitz 2  Inpatient admission  Admit to stepdown  Cardiac telemetry  Bedside rest  Keep n.p.o. after midnight  Cardiac diet for now  Troponin x 3  Echocardiogram  EP consult  Serial EKG  Morbid obesity (Multi)  Lifestyle modification  Hyperlipidemia  Resume home medical  Diabetes type 2, controlled (Multi)  Accu-Chek ACHS  Insulin sliding scale  Hypoglycemia protocol    DVT prophylaxis Lovenox daily  Chronic venous insufficiency  Leg elevation   Compression stocking              Linda Brooks MD

## 2025-02-25 NOTE — Clinical Note
Sheath was exchanged in the right axillary vein with INTRODUCER, HYDROPHILIC, PRELUDE SNAP, 7 FR X 13 CM, ORANGE. X2 wire retained along side

## 2025-02-25 NOTE — ASSESSMENT & PLAN NOTE
Inpatient admission  Admit to stepdown  Cardiac telemetry  Bedside rest  Keep n.p.o. after midnight  Cardiac diet for now  Troponin x 3  Echocardiogram  EP consult  Serial EKG

## 2025-02-25 NOTE — Clinical Note
Everything out oriented to person, place and time , normal sensation , short and long term memory intact , oriented to person, place and time , normal sensation , short and long term memory intact

## 2025-02-25 NOTE — ED PROVIDER NOTES
79-year-old male presents emergency department with chief complaint of dizziness and slurred speech.  Patient reports he has had the symptoms for the past 2 weeks.  Patient also reports he was told his EKG was abnormal at his doctor's appointment today.  He states he had a doctor's appointment regarding a wound on his right second toe and they sent him here due to his reported symptoms of dizziness and they noticed his slurred speech as well.  Patient reports his dizziness as feeling off balance.  He does report this off-balance sensation caused him to fall into a doorway.  No fevers, coughing, or congestion.  Patient denies any chest pain or significant difficulty breathing.  No abdominal pain, nausea, vomiting, dysuria, diarrhea, constipation, black or bloody stools.  Patient denies any headache. Patient reports he is currently on antibiotic for his right toe wound.  Chart review shows history of hyperlipidemia, lumbar spondylosis, elevated BMI, diabetes, and venous insufficiency.  Patient is not on any anticoagulants.  Reports remote history of tobacco use.  Denies any illicit drug use or regular alcohol use.      History provided by:  Patient   used: No           ------------------------------------------------------------------------------------------------------------------------------------------    VS: As documented in the triage note and EMR flowsheet from this visit were reviewed.    Review of Systems  Constitutional: no fever, chills reports malaise and fatigue  Eyes: no redness, discharge, pain  HENT: no sore throat, nose bleeds, congestion, rhinorrhea   Cardiovascular: no chest pain, palpitations reports chronic lower extremity edema at baseline  Respiratory: no shortness of breath, cough, wheezing  GI: no nausea, diarrhea, pain, vomiting, constipation, BRBPR, melena  : no dysuria, frequency, hematuria  Musculoskeletal: no neck pain, stiffness,  no joint deformity, swelling  Skin:  no rash, erythema, reports right toe wound  Neurological: no headache, lightheadedness, weakness, numbness, or tingling admits to dizziness and slurred speech  Psychiatric: no suicidal thoughts, confusion, agitation  Metabolic: no polyuria or polydipsia  Hematologic: no increased bleeding or bruising  Immunology: No immunocompromise state    [facility]  Nursing notes reviewed and confirmed by me.  Chart review performed including medications, allergies, and medical, surgical, and family history  Visit Vitals  /63 (BP Location: Right arm, Patient Position: Lying)   Pulse 54   Temp 35.5 °C (95.9 °F) (Temporal)   Resp 16     Physical Exam  Vitals and nursing note reviewed.   Constitutional:       General: He is not in acute distress.     Appearance: Normal appearance. He is not ill-appearing.   HENT:      Head: Normocephalic and atraumatic.      Right Ear: External ear normal.      Left Ear: External ear normal.      Nose: Nose normal. No congestion or rhinorrhea.      Mouth/Throat:      Mouth: Mucous membranes are moist.      Pharynx: No oropharyngeal exudate or posterior oropharyngeal erythema.   Eyes:      Extraocular Movements: Extraocular movements intact.      Conjunctiva/sclera: Conjunctivae normal.      Pupils: Pupils are equal, round, and reactive to light.   Cardiovascular:      Rate and Rhythm: Regular rhythm. Bradycardia present.      Pulses: Normal pulses.      Heart sounds: Normal heart sounds.   Pulmonary:      Effort: Pulmonary effort is normal. No respiratory distress.      Breath sounds: Normal breath sounds. No stridor. No wheezing, rhonchi or rales.   Abdominal:      General: There is no distension.      Palpations: Abdomen is soft.      Tenderness: There is no abdominal tenderness. There is no guarding or rebound.   Musculoskeletal:         General: No swelling or deformity. Normal range of motion.      Cervical back: Normal range of motion and neck supple. No rigidity.      Right lower leg: Edema  present.      Left lower leg: Edema present.      Comments: No calf tenderness   Bilateral lower extremity edema 1+.  Slightly worse on the left when compared to the right.  Patient states this is chronic   Skin:     General: Skin is warm and dry.      Capillary Refill: Capillary refill takes less than 2 seconds.      Coloration: Skin is not jaundiced.      Findings: Erythema present. No rash.      Comments: Ulcerative wound on the dorsal aspect of the right second toe at the PIP joint.  Surrounding erythema and edema of the second toe.  No purulent drainage.   Neurological:      General: No focal deficit present.      Mental Status: He is alert and oriented to person, place, and time.      Sensory: No sensory deficit.      Motor: No weakness.      Comments: Patient appreciated to have slightly slurred speech.  No facial droop.  He has equal strength and sensation in upper and lower extremities.  Normal finger-to-nose testing.  NIH stroke scale is 1 for slurred speech.  Patient reports last known normal of at least 2 weeks ago.    Cranial nerves II through XII grossly intact.   Psychiatric:         Mood and Affect: Mood normal.         Behavior: Behavior normal.        No past medical history on file.   No past surgical history on file.   Social History     Socioeconomic History    Marital status:    Tobacco Use    Smoking status: Never    Smokeless tobacco: Never   Substance and Sexual Activity    Drug use: Never     Social Drivers of Health     Financial Resource Strain: Low Risk  (2/25/2025)    Overall Financial Resource Strain (CARDIA)     Difficulty of Paying Living Expenses: Not hard at all   Food Insecurity: No Food Insecurity (2/25/2025)    Hunger Vital Sign     Worried About Running Out of Food in the Last Year: Never true     Ran Out of Food in the Last Year: Never true   Transportation Needs: No Transportation Needs (2/25/2025)    PRAPARE - Transportation     Lack of Transportation (Medical): No      Lack of Transportation (Non-Medical): No   Intimate Partner Violence: Not At Risk (2/25/2025)    Humiliation, Afraid, Rape, and Kick questionnaire     Fear of Current or Ex-Partner: No     Emotionally Abused: No     Physically Abused: No     Sexually Abused: No   Housing Stability: Low Risk  (2/25/2025)    Housing Stability Vital Sign     Unable to Pay for Housing in the Last Year: No     Number of Times Moved in the Last Year: 0     Homeless in the Last Year: No      ------------------------------------------------------------------------------------------------------------------------------------------  XR chest 1 view   Final Result   No acute cardiopulmonary process radiographically.        MACRO:   None.        Signed by: Ruth Morales 2/25/2025 4:29 PM   Dictation workstation:   [id number]      CT head wo IV contrast   Final Result   No acute intracranial hemorrhage, mass effect, or CT apparent acute   infarct. Chronic microvascular ischemia and involutional changes.        Signed by: Aj Bergman 2/25/2025 4:27 PM   Dictation workstation:   [id number]      Transthoracic Echo (TTE) Complete    (Results Pending)      Labs Reviewed   CBC WITH AUTO DIFFERENTIAL - Abnormal       Result Value    WBC 5.2      nRBC 0.0      RBC 3.75 (*)     Hemoglobin 10.9 (*)     Hematocrit 34.3 (*)     MCV 92      MCH 29.1      MCHC 31.8 (*)     RDW 15.5 (*)     Platelets 135 (*)     Neutrophils % 51.0      Immature Granulocytes %, Automated 0.2      Lymphocytes % 35.0      Monocytes % 9.9      Eosinophils % 3.5      Basophils % 0.4      Neutrophils Absolute 2.63      Immature Granulocytes Absolute, Automated 0.01      Lymphocytes Absolute 1.80      Monocytes Absolute 0.51      Eosinophils Absolute 0.18      Basophils Absolute 0.02     COMPREHENSIVE METABOLIC PANEL - Abnormal    Glucose 108 (*)     Sodium 140      Potassium 4.4      Chloride 105      Bicarbonate 29      Anion Gap 10      Urea Nitrogen 36 (*)     Creatinine  0.98      eGFR 78      Calcium 9.2      Albumin 3.6      Alkaline Phosphatase 67      Total Protein 6.6      AST 28      Bilirubin, Total 0.4      ALT 21     B-TYPE NATRIURETIC PEPTIDE - Abnormal     (*)     Narrative:        <100 pg/mL - Heart failure unlikely  100-299 pg/mL - Intermediate probability of acute heart                  failure exacerbation. Correlate with clinical                  context and patient history.    >=300 pg/mL - Heart Failure likely. Correlate with clinical                  context and patient history.    BNP testing is performed using different testing methodology at [facility] than at other [facility] hospitals. Direct result comparisons should only be made within the same method.      D-DIMER, VTE EXCLUSION - Abnormal    D-Dimer, Quantitative VTE Exclusion 552 (*)     Narrative:     The VTE Exclusion D-Dimer assay is reported in ng/mL Fibrinogen Equivalent Units (FEU).    Per 's instructions for use, a value of less than 500 ng/mL (FEU) may help to exclude DVT or PE in outpatients when the assay is used with a clinical pretest probability assessment.(AE must utilize and document eCalc 'Wells Score Deep Vein Thrombosis Risk' for DVT exclusion only. Emergency Department should utilize  Guidelines for Emergency Department Use of the VTE Exclusion D-Dimer and Clinical Pretest probability assessment model for DVT or PE exclusion.)   POCT GLUCOSE - Abnormal    POCT Glucose 106 (*)    MAGNESIUM - Normal    Magnesium 1.89     PROTIME-INR - Normal    Protime 11.7      INR 1.1     APTT - Normal    aPTT 34      Narrative:     The APTT is no longer used for monitoring Unfractionated Heparin Therapy. For monitoring Heparin Therapy, use the Heparin Assay.   LACTATE - Normal    Lactate 0.6      Narrative:     Venipuncture immediately after or during the administration of Metamizole may lead to falsely low results. Testing should be performed immediately prior to  Metamizole dosing.   SARS-COV-2 AND INFLUENZA A/B PCR - Normal    Flu A Result Not Detected      Flu B Result Not Detected      Coronavirus 2019, PCR Not Detected      Narrative:     This assay is an FDA-cleared, in vitro diagnostic nucleic acid amplification test for the qualitative detection and differentiation of SARS CoV-2/ Influenza A/B from nasopharyngeal specimens collected from individuals with signs and symptoms of respiratory tract infections, and has been validated for use at . Negative results do not preclude COVID-19/ Influenza A/B infections and should not be used as the sole basis for diagnosis, treatment, or other management decisions. Testing for SARS CoV-2 is recommended only for patients who meet current clinical and/or epidemiological criteria defined by federal, state, or local public health directives.   SERIAL TROPONIN-INITIAL - Normal    Troponin I, High Sensitivity 4      Narrative:     Less than 99th percentile of normal range cutoff-  Female and children under 18 years old <14 ng/L; Male <21 ng/L: Negative  Repeat testing should be performed if clinically indicated.     Female and children under 18 years old 14-50 ng/L; Male 21-50 ng/L:  Consistent with possible cardiac damage and possible increased clinical   risk. Serial measurements may help to assess extent of myocardial damage.     >50 ng/L: Consistent with cardiac damage, increased clinical risk and  myocardial infarction. Serial measurements may help assess extent of   myocardial damage.      NOTE: Children less than 1 year old may have higher baseline troponin   levels and results should be interpreted in conjunction with the overall   clinical context.     NOTE: Troponin I testing is performed using a different   testing methodology at [facility] than at other   [facility]. Direct result comparisons should only   be made within the same method.   SERIAL TROPONIN, 1 HOUR -  Normal    Troponin I, High Sensitivity 4      Narrative:     Less than 99th percentile of normal range cutoff-  Female and children under 18 years old <14 ng/L; Male <21 ng/L: Negative  Repeat testing should be performed if clinically indicated.     Female and children under 18 years old 14-50 ng/L; Male 21-50 ng/L:  Consistent with possible cardiac damage and possible increased clinical   risk. Serial measurements may help to assess extent of myocardial damage.     >50 ng/L: Consistent with cardiac damage, increased clinical risk and  myocardial infarction. Serial measurements may help assess extent of   myocardial damage.      NOTE: Children less than 1 year old may have higher baseline troponin   levels and results should be interpreted in conjunction with the overall   clinical context.     NOTE: Troponin I testing is performed using a different   testing methodology at [facility] than at other   [facility]. Direct result comparisons should only   be made within the same method.   TROPONIN SERIES- (INITIAL, 1 HR)    Narrative:     The following orders were created for panel order Troponin I Series, High Sensitivity (0, 1 HR).  Procedure                               Abnormality         Status                     ---------                               -----------         ------                     Troponin I, High Sensiti...[[id number]]  Normal              Final result               Troponin, High Sensitivi...[[id number]]  Normal              Final result                 Please view results for these tests on the individual orders.   BASIC METABOLIC PANEL   POCT FINGERSTICK GLUCOSE        Medical Decision Making  EKG interpreted by ED physician: Sinus bradycardia with questionable secondary block.  Rate of 29.  No significant ST elevations or depressions.  No significant Q waves.  Poor R wave progression.  Normal axis.  Some baseline artifact.    Repeat EKG interpreted by ED physician: Sinus  bradycardia with first-degree AV block rate of 43.  KY prolonged at 230.  QRS and QTc are within normal range.  No significant ST elevations or depressions.  No significant Q waves.  Poor R wave progression.  Left axis deviation.    79-year-old male presents emergency department with reports of dizziness and slurred speech.  He also reportedly had abnormal EKG at his doctor's appointment today.  Patient reports the dizziness and slurred speech have been going on for the past 2 weeks.  He is not in the window for TNK though I do consider stroke is a possibility for his symptoms.  NIH stroke scale is a 1 for slightly slurred speech.  Otherwise I did not appreciate any focal neurologic deficits.  A thorough workup is obtained given his presenting symptoms.  Cardiac enzymes x 2 and EKG do not show acute ACS.  EKG did show significant sinus bradycardia as well as findings concerning for second-degree heart block.  I did discuss these EKGs with Dr. Kevin who recommended admission, bedrest, and EP consultation.  He does not feel that the patient requires emergent temporary pacer.  Patient has been hemodynamically stable here in the ED.  D-dimer is age-adjusted negative.  BNP is not significantly elevated patient does not have findings of decompensated heart failure.  CBC does not show significant leukocytosis and shows anemia at baseline.  CMP shows no significant metabolic abnormality.  COVID and flu testing are negative.  Head CT shows no acute intracranial process such as hemorrhage or mass effect.  Chest x-ray did not show any acute cardiopulmonary process such as pneumonia, pleural effusion, or pulmonary edema.  Patient treated with gentle fluid bolus.  Given his findings of significant bradycardia which I suspect is symptomatic I recommend admission for further evaluation and treatment.  Patient agreeable with this plan.  Case discussed with hospitalist on-call.       Diagnoses as of 02/26/25 0045   Arrhythmia    Dizziness   Slurred speech   Cellulitis of toe of right foot      1. Arrhythmia  Transthoracic Echo (TTE) Complete    Transthoracic Echo (TTE) Complete      2. Dizziness  Transthoracic Echo (TTE) Complete    Transthoracic Echo (TTE) Complete      3. Slurred speech        4. Cellulitis of toe of right foot        5. Postural dizziness with near syncope  Transthoracic Echo (TTE) Complete    Transthoracic Echo (TTE) Complete         Critical Care    Performed by: Mariusz Vazquez DO  Authorized by: Mariusz Vazquez DO    Critical care provider statement:     Critical care time (minutes):  32    Critical care time was exclusive of:  Separately billable procedures and treating other patients    Critical care was necessary to treat or prevent imminent or life-threatening deterioration of the following conditions: cardiac arrhythmia.    Critical care was time spent personally by me on the following activities:  Development of treatment plan with patient or surrogate, discussions with consultants, evaluation of patient's response to treatment, examination of patient, re-evaluation of patient's condition, pulse oximetry, ordering and review of radiographic studies, ordering and review of laboratory studies, ordering and performing treatments and interventions and review of old charts    Care discussed with: admitting provider         This note was dictated using dragon software and may contain errors related to dictation interpretation errors.      Mariusz Vazquez DO  02/26/25 0045

## 2025-02-26 ENCOUNTER — APPOINTMENT (OUTPATIENT)
Dept: CARDIOLOGY | Facility: HOSPITAL | Age: 80
End: 2025-02-26
Payer: MEDICARE

## 2025-02-26 PROBLEM — R00.1 BRADYCARDIA: Status: ACTIVE | Noted: 2025-02-25

## 2025-02-26 LAB
ANION GAP SERPL CALC-SCNC: 10 MMOL/L (ref 10–20)
AORTIC VALVE MEAN GRADIENT: 3 MMHG
AORTIC VALVE PEAK VELOCITY: 1.16 M/S
ATRIAL RATE: 468 BPM
AV PEAK GRADIENT: 5 MMHG
AVA (PEAK VEL): 2.19 CM2
AVA (VTI): 2.31 CM2
BUN SERPL-MCNC: 33 MG/DL (ref 6–23)
CALCIUM SERPL-MCNC: 9 MG/DL (ref 8.6–10.3)
CHLORIDE SERPL-SCNC: 107 MMOL/L (ref 98–107)
CO2 SERPL-SCNC: 28 MMOL/L (ref 21–32)
CREAT SERPL-MCNC: 0.94 MG/DL (ref 0.5–1.3)
EGFRCR SERPLBLD CKD-EPI 2021: 82 ML/MIN/1.73M*2
EJECTION FRACTION APICAL 4 CHAMBER: 57.8
EJECTION FRACTION: 55 %
GLUCOSE BLD MANUAL STRIP-MCNC: 156 MG/DL (ref 74–99)
GLUCOSE BLD MANUAL STRIP-MCNC: 69 MG/DL (ref 74–99)
GLUCOSE BLD MANUAL STRIP-MCNC: 92 MG/DL (ref 74–99)
GLUCOSE SERPL-MCNC: 68 MG/DL (ref 74–99)
HOLD SPECIMEN: NORMAL
HOLD SPECIMEN: NORMAL
LEFT ATRIUM VOLUME AREA LENGTH INDEX BSA: 26.3 ML/M2
LEFT VENTRICLE INTERNAL DIMENSION DIASTOLE: 5.05 CM (ref 3.5–6)
LEFT VENTRICULAR OUTFLOW TRACT DIAMETER: 2 CM
LV EJECTION FRACTION BIPLANE: 57 %
MITRAL VALVE E/A RATIO: 1.08
POTASSIUM SERPL-SCNC: 4.2 MMOL/L (ref 3.5–5.3)
PR INTERVAL: 168 MS
Q ONSET: 181 MS
QRS COUNT: 10 BEATS
QRS DURATION: 182 MS
QT INTERVAL: 508 MS
QTC CALCULATION(BAZETT): 508 MS
QTC FREDERICIA: 508 MS
R AXIS: -83 DEGREES
RIGHT VENTRICLE FREE WALL PEAK S': 9.28 CM/S
RIGHT VENTRICLE PEAK SYSTOLIC PRESSURE: 35.5 MMHG
SODIUM SERPL-SCNC: 141 MMOL/L (ref 136–145)
T AXIS: 47 DEGREES
T OFFSET: 435 MS
TRICUSPID ANNULAR PLANE SYSTOLIC EXCURSION: 1.6 CM
VENTRICULAR RATE: 60 BPM

## 2025-02-26 PROCEDURE — 36415 COLL VENOUS BLD VENIPUNCTURE: CPT | Performed by: HOSPITALIST

## 2025-02-26 PROCEDURE — 2500000004 HC RX 250 GENERAL PHARMACY W/ HCPCS (ALT 636 FOR OP/ED): Performed by: HOSPITALIST

## 2025-02-26 PROCEDURE — C1889 IMPLANT/INSERT DEVICE, NOC: HCPCS | Performed by: INTERNAL MEDICINE

## 2025-02-26 PROCEDURE — 2500000001 HC RX 250 WO HCPCS SELF ADMINISTERED DRUGS (ALT 637 FOR MEDICARE OP): Performed by: HOSPITALIST

## 2025-02-26 PROCEDURE — 75860 VEIN X-RAY NECK: CPT | Performed by: INTERNAL MEDICINE

## 2025-02-26 PROCEDURE — 0JH606Z INSERTION OF PACEMAKER, DUAL CHAMBER INTO CHEST SUBCUTANEOUS TISSUE AND FASCIA, OPEN APPROACH: ICD-10-PCS | Performed by: INTERNAL MEDICINE

## 2025-02-26 PROCEDURE — 82947 ASSAY GLUCOSE BLOOD QUANT: CPT

## 2025-02-26 PROCEDURE — 80048 BASIC METABOLIC PNL TOTAL CA: CPT | Performed by: HOSPITALIST

## 2025-02-26 PROCEDURE — 2500000001 HC RX 250 WO HCPCS SELF ADMINISTERED DRUGS (ALT 637 FOR MEDICARE OP): Performed by: STUDENT IN AN ORGANIZED HEALTH CARE EDUCATION/TRAINING PROGRAM

## 2025-02-26 PROCEDURE — C1785 PMKR, DUAL, RATE-RESP: HCPCS | Performed by: INTERNAL MEDICINE

## 2025-02-26 PROCEDURE — C1769 GUIDE WIRE: HCPCS | Performed by: INTERNAL MEDICINE

## 2025-02-26 PROCEDURE — 2500000002 HC RX 250 W HCPCS SELF ADMINISTERED DRUGS (ALT 637 FOR MEDICARE OP, ALT 636 FOR OP/ED): Performed by: HOSPITALIST

## 2025-02-26 PROCEDURE — C1894 INTRO/SHEATH, NON-LASER: HCPCS | Performed by: INTERNAL MEDICINE

## 2025-02-26 PROCEDURE — 2720000007 HC OR 272 NO HCPCS: Performed by: INTERNAL MEDICINE

## 2025-02-26 PROCEDURE — 2500000005 HC RX 250 GENERAL PHARMACY W/O HCPCS: Performed by: NURSE PRACTITIONER

## 2025-02-26 PROCEDURE — 7100000010 HC PHASE TWO TIME - EACH INCREMENTAL 1 MINUTE: Performed by: INTERNAL MEDICINE

## 2025-02-26 PROCEDURE — 99232 SBSQ HOSP IP/OBS MODERATE 35: CPT | Performed by: HOSPITALIST

## 2025-02-26 PROCEDURE — 7100000002 HC RECOVERY ROOM TIME - EACH INCREMENTAL 1 MINUTE: Performed by: INTERNAL MEDICINE

## 2025-02-26 PROCEDURE — 33208 INSRT HEART PM ATRIAL & VENT: CPT | Performed by: INTERNAL MEDICINE

## 2025-02-26 PROCEDURE — 2780000003 HC OR 278 NO HCPCS: Performed by: INTERNAL MEDICINE

## 2025-02-26 PROCEDURE — 99223 1ST HOSP IP/OBS HIGH 75: CPT | Performed by: INTERNAL MEDICINE

## 2025-02-26 PROCEDURE — 2750000001 HC OR 275 NO HCPCS: Performed by: INTERNAL MEDICINE

## 2025-02-26 PROCEDURE — 2500000004 HC RX 250 GENERAL PHARMACY W/ HCPCS (ALT 636 FOR OP/ED): Performed by: NURSE PRACTITIONER

## 2025-02-26 PROCEDURE — 02H63JZ INSERTION OF PACEMAKER LEAD INTO RIGHT ATRIUM, PERCUTANEOUS APPROACH: ICD-10-PCS | Performed by: INTERNAL MEDICINE

## 2025-02-26 PROCEDURE — 93010 ELECTROCARDIOGRAM REPORT: CPT | Performed by: INTERNAL MEDICINE

## 2025-02-26 PROCEDURE — 2060000001 HC INTERMEDIATE ICU ROOM DAILY

## 2025-02-26 PROCEDURE — 99152 MOD SED SAME PHYS/QHP 5/>YRS: CPT | Performed by: INTERNAL MEDICINE

## 2025-02-26 PROCEDURE — 2550000001 HC RX 255 CONTRASTS: Performed by: INTERNAL MEDICINE

## 2025-02-26 PROCEDURE — 93306 TTE W/DOPPLER COMPLETE: CPT | Performed by: INTERNAL MEDICINE

## 2025-02-26 PROCEDURE — 2500000004 HC RX 250 GENERAL PHARMACY W/ HCPCS (ALT 636 FOR OP/ED): Performed by: INTERNAL MEDICINE

## 2025-02-26 PROCEDURE — 7100000009 HC PHASE TWO TIME - INITIAL BASE CHARGE: Performed by: INTERNAL MEDICINE

## 2025-02-26 PROCEDURE — C1898 LEAD, PMKR, OTHER THAN TRANS: HCPCS | Performed by: INTERNAL MEDICINE

## 2025-02-26 PROCEDURE — 99153 MOD SED SAME PHYS/QHP EA: CPT | Performed by: INTERNAL MEDICINE

## 2025-02-26 PROCEDURE — 93005 ELECTROCARDIOGRAM TRACING: CPT

## 2025-02-26 PROCEDURE — C8929 TTE W OR WO FOL WCON,DOPPLER: HCPCS

## 2025-02-26 PROCEDURE — 7100000001 HC RECOVERY ROOM TIME - INITIAL BASE CHARGE: Performed by: INTERNAL MEDICINE

## 2025-02-26 PROCEDURE — C1892 INTRO/SHEATH,FIXED,PEEL-AWAY: HCPCS | Performed by: INTERNAL MEDICINE

## 2025-02-26 PROCEDURE — 02HK3JZ INSERTION OF PACEMAKER LEAD INTO RIGHT VENTRICLE, PERCUTANEOUS APPROACH: ICD-10-PCS | Performed by: INTERNAL MEDICINE

## 2025-02-26 PROCEDURE — 33208 INSRT HEART PM ATRIAL & VENT: CPT | Mod: 22 | Performed by: INTERNAL MEDICINE

## 2025-02-26 DEVICE — ENVELOPE CMRM6122 ABSORB MED US
Type: IMPLANTABLE DEVICE | Site: CHEST | Status: FUNCTIONAL
Brand: TYRX™ ABSORBABLE ANTIBACTERIAL ENVELOPE - MEDIUM

## 2025-02-26 DEVICE — LEAD 5076-58 CAPSUREFIX NOVUS US EN
Type: IMPLANTABLE DEVICE | Site: CHEST | Status: FUNCTIONAL
Brand: CAPSUREFIX® NOVUS

## 2025-02-26 DEVICE — LEAD 5076-52 CAPSUREFIX NOVUS US EN
Type: IMPLANTABLE DEVICE | Site: CHEST | Status: FUNCTIONAL
Brand: CAPSUREFIX® NOVUS

## 2025-02-26 DEVICE — IPG W1DR01 AZURE XT DR MRI WL USA BCP
Type: IMPLANTABLE DEVICE | Site: CHEST | Status: FUNCTIONAL
Brand: AZURE™ XT DR MRI SURESCAN™

## 2025-02-26 RX ORDER — FENTANYL CITRATE 50 UG/ML
INJECTION, SOLUTION INTRAMUSCULAR; INTRAVENOUS AS NEEDED
Status: DISCONTINUED | OUTPATIENT
Start: 2025-02-26 | End: 2025-02-26 | Stop reason: HOSPADM

## 2025-02-26 RX ORDER — BUPIVACAINE HYDROCHLORIDE 2.5 MG/ML
INJECTION, SOLUTION EPIDURAL; INFILTRATION; INTRACAUDAL AS NEEDED
Status: DISCONTINUED | OUTPATIENT
Start: 2025-02-26 | End: 2025-02-26 | Stop reason: HOSPADM

## 2025-02-26 RX ORDER — MIDAZOLAM HYDROCHLORIDE 1 MG/ML
INJECTION, SOLUTION INTRAMUSCULAR; INTRAVENOUS AS NEEDED
Status: DISCONTINUED | OUTPATIENT
Start: 2025-02-26 | End: 2025-02-26 | Stop reason: HOSPADM

## 2025-02-26 RX ORDER — MUPIROCIN 20 MG/G
1 OINTMENT TOPICAL ONCE
Status: COMPLETED | OUTPATIENT
Start: 2025-02-26 | End: 2025-02-26

## 2025-02-26 RX ORDER — DEXTROSE 50 % IN WATER (D50W) INTRAVENOUS SYRINGE
12.5
Status: DISCONTINUED | OUTPATIENT
Start: 2025-02-26 | End: 2025-02-27 | Stop reason: HOSPADM

## 2025-02-26 RX ORDER — IPRATROPIUM BROMIDE 42 UG/1
2 SPRAY, METERED NASAL 3 TIMES DAILY PRN
COMMUNITY

## 2025-02-26 RX ORDER — ALBUTEROL SULFATE 90 UG/1
2 INHALANT RESPIRATORY (INHALATION) EVERY 6 HOURS PRN
Status: DISCONTINUED | OUTPATIENT
Start: 2025-02-26 | End: 2025-02-27 | Stop reason: HOSPADM

## 2025-02-26 RX ORDER — MULTIVIT-MIN/IRON FUM/FOLIC AC 7.5 MG-4
1 TABLET ORAL DAILY
Status: DISCONTINUED | OUTPATIENT
Start: 2025-02-26 | End: 2025-02-27 | Stop reason: HOSPADM

## 2025-02-26 RX ORDER — TRAZODONE HYDROCHLORIDE 50 MG/1
50 TABLET ORAL ONCE
Status: COMPLETED | OUTPATIENT
Start: 2025-02-26 | End: 2025-02-26

## 2025-02-26 RX ORDER — GABAPENTIN 300 MG/1
300 CAPSULE ORAL
COMMUNITY

## 2025-02-26 RX ORDER — LIDOCAINE HYDROCHLORIDE 10 MG/ML
INJECTION, SOLUTION EPIDURAL; INFILTRATION; INTRACAUDAL; PERINEURAL AS NEEDED
Status: DISCONTINUED | OUTPATIENT
Start: 2025-02-26 | End: 2025-02-26 | Stop reason: HOSPADM

## 2025-02-26 RX ORDER — ATORVASTATIN CALCIUM 10 MG/1
10 TABLET, FILM COATED ORAL NIGHTLY
Status: DISCONTINUED | OUTPATIENT
Start: 2025-02-26 | End: 2025-02-27 | Stop reason: HOSPADM

## 2025-02-26 RX ORDER — TRAMADOL HYDROCHLORIDE 50 MG/1
50 TABLET ORAL EVERY 6 HOURS PRN
Status: DISCONTINUED | OUTPATIENT
Start: 2025-02-26 | End: 2025-02-27 | Stop reason: HOSPADM

## 2025-02-26 RX ORDER — DESONIDE 0.5 MG/G
CREAM TOPICAL 2 TIMES DAILY PRN
COMMUNITY

## 2025-02-26 RX ORDER — IPRATROPIUM BROMIDE 42 UG/1
2 SPRAY, METERED NASAL 3 TIMES DAILY PRN
Status: DISCONTINUED | OUTPATIENT
Start: 2025-02-26 | End: 2025-02-27 | Stop reason: HOSPADM

## 2025-02-26 RX ORDER — GABAPENTIN 300 MG/1
300 CAPSULE ORAL 2 TIMES DAILY
Status: DISCONTINUED | OUTPATIENT
Start: 2025-02-26 | End: 2025-02-27 | Stop reason: HOSPADM

## 2025-02-26 RX ORDER — AMOXICILLIN AND CLAVULANATE POTASSIUM 875; 125 MG/1; MG/1
875 TABLET, FILM COATED ORAL 2 TIMES DAILY
COMMUNITY
Start: 2025-02-20

## 2025-02-26 RX ORDER — ALBUTEROL SULFATE 90 UG/1
2 INHALANT RESPIRATORY (INHALATION) EVERY 6 HOURS PRN
COMMUNITY

## 2025-02-26 RX ORDER — DEXTROSE 50 % IN WATER (D50W) INTRAVENOUS SYRINGE
25
Status: DISCONTINUED | OUTPATIENT
Start: 2025-02-26 | End: 2025-02-27 | Stop reason: HOSPADM

## 2025-02-26 RX ORDER — TAMSULOSIN HYDROCHLORIDE 0.4 MG/1
0.4 CAPSULE ORAL DAILY
Status: DISCONTINUED | OUTPATIENT
Start: 2025-02-26 | End: 2025-02-27 | Stop reason: HOSPADM

## 2025-02-26 RX ORDER — ATORVASTATIN CALCIUM 10 MG/1
10 TABLET, FILM COATED ORAL DAILY
COMMUNITY

## 2025-02-26 RX ORDER — VIT C/E/ZN/COPPR/LUTEIN/ZEAXAN 250MG-90MG
3 CAPSULE ORAL DAILY
COMMUNITY

## 2025-02-26 RX ORDER — CHLORHEXIDINE GLUCONATE 40 MG/ML
SOLUTION TOPICAL ONCE
Status: COMPLETED | OUTPATIENT
Start: 2025-02-26 | End: 2025-02-26

## 2025-02-26 RX ORDER — IODIXANOL 320 MG/ML
INJECTION, SOLUTION INTRAVASCULAR AS NEEDED
Status: DISCONTINUED | OUTPATIENT
Start: 2025-02-26 | End: 2025-02-26 | Stop reason: HOSPADM

## 2025-02-26 RX ORDER — TAMSULOSIN HYDROCHLORIDE 0.4 MG/1
0.4 CAPSULE ORAL DAILY
COMMUNITY
Start: 2025-01-02

## 2025-02-26 RX ADMIN — ATORVASTATIN CALCIUM 10 MG: 10 TABLET ORAL at 20:06

## 2025-02-26 RX ADMIN — MUPIROCIN 1 APPLICATION: 20 OINTMENT TOPICAL at 13:47

## 2025-02-26 RX ADMIN — PERFLUTREN 3 ML OF DILUTION: 6.52 INJECTION, SUSPENSION INTRAVENOUS at 09:30

## 2025-02-26 RX ADMIN — ANTISEPTIC SURGICAL SCRUB: 0.04 SOLUTION TOPICAL at 13:47

## 2025-02-26 RX ADMIN — VANCOMYCIN HYDROCHLORIDE 2000 MG: 5 INJECTION, POWDER, LYOPHILIZED, FOR SOLUTION INTRAVENOUS at 13:48

## 2025-02-26 RX ADMIN — TAMSULOSIN HYDROCHLORIDE 0.4 MG: 0.4 CAPSULE ORAL at 12:56

## 2025-02-26 RX ADMIN — Medication 5 MG: at 20:06

## 2025-02-26 RX ADMIN — ENOXAPARIN SODIUM 40 MG: 100 INJECTION SUBCUTANEOUS at 08:13

## 2025-02-26 RX ADMIN — GABAPENTIN 300 MG: 300 CAPSULE ORAL at 20:06

## 2025-02-26 RX ADMIN — TRAZODONE HYDROCHLORIDE 50 MG: 50 TABLET ORAL at 00:50

## 2025-02-26 RX ADMIN — GABAPENTIN 300 MG: 300 CAPSULE ORAL at 12:56

## 2025-02-26 RX ADMIN — Medication 1 TABLET: at 12:56

## 2025-02-26 ASSESSMENT — COGNITIVE AND FUNCTIONAL STATUS - GENERAL
TURNING FROM BACK TO SIDE WHILE IN FLAT BAD: A LITTLE
MOVING FROM LYING ON BACK TO SITTING ON SIDE OF FLAT BED WITH BEDRAILS: A LITTLE
MOBILITY SCORE: 18
DAILY ACTIVITIY SCORE: 19
PERSONAL GROOMING: A LITTLE
WALKING IN HOSPITAL ROOM: A LITTLE
DRESSING REGULAR LOWER BODY CLOTHING: A LITTLE
DRESSING REGULAR UPPER BODY CLOTHING: A LITTLE
CLIMB 3 TO 5 STEPS WITH RAILING: A LITTLE
MOVING TO AND FROM BED TO CHAIR: A LITTLE
EATING MEALS: A LITTLE
STANDING UP FROM CHAIR USING ARMS: A LITTLE
TOILETING: A LITTLE

## 2025-02-26 ASSESSMENT — ENCOUNTER SYMPTOMS
DIZZINESS: 1
LIGHT-HEADEDNESS: 1
IRREGULAR HEARTBEAT: 1
NEAR-SYNCOPE: 1
PALPITATIONS: 1
SYNCOPE: 0

## 2025-02-26 ASSESSMENT — PAIN - FUNCTIONAL ASSESSMENT
PAIN_FUNCTIONAL_ASSESSMENT: 0-10

## 2025-02-26 ASSESSMENT — PAIN SCALES - GENERAL
PAINLEVEL_OUTOF10: 0 - NO PAIN

## 2025-02-26 NOTE — INTERVAL H&P NOTE
H&P reviewed. The patient was examined and there are no changes to the H&P.    In addition,  He has blurry vision.  This is unchanged over the last 3 weeks.    Lab Results   Component Value Date    WBC 5.2 02/25/2025    HGB 10.9 (L) 02/25/2025    HCT 34.3 (L) 02/25/2025    MCV 92 02/25/2025     (L) 02/25/2025      Lab Results   Component Value Date    GLUCOSE 68 (L) 02/26/2025    CALCIUM 9.0 02/26/2025     02/26/2025    K 4.2 02/26/2025    CO2 28 02/26/2025     02/26/2025    BUN 33 (H) 02/26/2025    CREATININE 0.94 02/26/2025      Lab Results   Component Value Date    INR 1.1 02/25/2025    INR 1.2 (H) 06/10/2021    INR 1.2 (H) 09/22/2019    PROTIME 11.7 02/25/2025    PROTIME 13.8 (H) 06/10/2021    PROTIME 12.8 (H) 09/22/2019        Counseling with 50% of visit performed.  Preoperative cardiac evaluation completed.  Shared decision making performed.  [place] decision tool.  LVEF 55%.  No indication for defibrillator.  Plan for dual-chamber pacemaker for symptomatic bradycardia: Sinus bradycardia, intermittent complete heart block, and high-grade AV block.  All questions answered.  Patient appreciative.

## 2025-02-26 NOTE — PROGRESS NOTES
Sanya Geronimo is a 79 y.o. male on day 1 of admission presenting with Arrhythmia.      Subjective     Seen and examined   Clinically stable   No complaints   No chest pain or SOB         Objective     Last Recorded Vitals  /90 (BP Location: Left arm, Patient Position: Sitting)   Pulse 51   Temp 35.4 °C (95.7 °F) (Temporal)   Resp 18   Wt 121 kg (267 lb)   SpO2 97%   Intake/Output last 3 Shifts:    Intake/Output Summary (Last 24 hours) at 2/26/2025 1214  Last data filed at 2/26/2025 0713  Gross per 24 hour   Intake 240 ml   Output --   Net 240 ml       Admission Weight  Weight: 121 kg (267 lb) (02/25/25 1540)    Daily Weight  02/25/25 : 121 kg (267 lb)    Image Results  Transthoracic Echo (TTE) [place] [id number]   Tel [phone] Fax [phone]    TRANSTHORACIC ECHOCARDIOGRAM REPORT    Patient Name:       SANYA GERONIMO      Reading Physician:    [id number] Paulino DO  Study Date:         2/26/2025           Ordering Provider:    [id number] DAVIS  MRN/PID:            [id number]            Fellow:  Accession#:         [id number]        Nurse:  Date of Birth/Age:  1945 / 79      Sonographer:          Lalita Huggins RDCS  Gender Assigned at  M                   Additional Staff:  Birth:  Height:             172.72 cm           Admit Date:           2/25/2025  Weight:             121.11 kg           Admission Status:     Inpatient -                                                                Routine  BSA / BMI:          2.31 m2 / 40.60     Department Location:  [place] [address]                                     Echo Lab  Blood Pressure: 130 /55  mmHg    Study Type:    TRANSTHORACIC ECHO (TTE) COMPLETE  Diagnosis/ICD: Dizziness and giddiness-R42  Indication:    Dizziness  CPT Codes:     Echo Complete w Full [id number]    Patient History:  Diabetes:          Yes  Pertinent History: Hyperlipidemia.    Study Detail: The following Echo studies were performed: 2D, M-Mode, Doppler and                color flow. Technically challenging study due to body habitus.                Definity used as a contrast agent for endocardial border                definition. Total contrast used for this procedure was 3 mL via IV                push.       PHYSICIAN INTERPRETATION:  Left Ventricle: The left ventricular systolic function is normal, with a visually estimated ejection fraction of 55%. There is mild concentric left ventricular hypertrophy. There are no regional wall motion abnormalities. The left ventricular cavity size is normal. There is mild increased septal and mildly increased posterior left ventricular wall thickness. Spectral Doppler shows a normal pattern of left ventricular diastolic filling.  LV Wall Scoring:  All segments are normal.    Left Atrium: The left atrial size is normal.  Right Ventricle: The right ventricle is normal in size. There is normal right ventricular global systolic function.  Right Atrium: The right atrial size is normal.  Aortic Valve: The aortic valve appears structurally normal. There is no evidence of aortic valve stenosis.  The aortic valve dimensionless index is 0.74. There is trace aortic valve regurgitation. The peak instantaneous gradient of the aortic valve is 5 mmHg. The mean gradient of the aortic valve is 3 mmHg.  Mitral Valve: The mitral valve is normal in structure. There is no evidence of mitral valve stenosis. There is normal mitral valve leaflet mobility. There is trace mitral valve regurgitation.  Tricuspid Valve: The tricuspid valve is structurally normal. There is normal tricuspid valve leaflet mobility. There is  trace tricuspid regurgitation.  Pulmonic Valve: The pulmonic valve is structurally normal. There is no indication of pulmonic valve regurgitation.  Pericardium: No pericardial effusion noted.  Aorta: The aortic root is normal.  Pulmonary Artery: The tricuspid regurgitant velocity is 2.85 m/s, and with an estimated right atrial pressure of 3 mmHg, the estimated pulmonary artery pressure is mildly elevated with the RVSP at 35.5 mmHg.  Systemic Veins: The inferior vena cava appears normal in size.  In comparison to the previous echocardiogram(s): The left ventricular function has improved and normal. The left ventricular diastolic function is normal.       CONCLUSIONS:   1. The left ventricular systolic function is normal, with a visually estimated ejection fraction of 55%.   2. There is normal right ventricular global systolic function.   3. Normal sized right ventricle.   4. There is no evidence of mitral valve stenosis.   5. Trace mitral valve regurgitation.   6. Trace tricuspid regurgitation is visualized.   7. Aortic valve stenosis is not present.    RECOMMENDATIONS:  Technically suboptimal and limited study, therefore accuracy of above interpretation could be substantially diminished. Clinical correlation is advised. Consider additional imaging modalities if clinically indicated.       QUANTITATIVE DATA SUMMARY:     2D MEASUREMENTS:             Normal Ranges:  IVSd:            1.11 cm     (0.6-1.1cm)  LVPWd:           1.07 cm     (0.6-1.1cm)  LVIDd:           5.05 cm     (3.9-5.9cm)  LVIDs:           3.45 cm  LV Mass Index:   90.0 g/m2  LVEDV Index:     46.92 ml/m2  LV % FS          31.7 %       LEFT ATRIUM:                  Normal Ranges:  LA Vol A4C:        54.6 ml    (22+/-6mL/m2)  LA Vol A2C:        66.4 ml  LA Vol BP:         60.7 ml  LA Vol Index A4C:  23.6ml/m2  LA Vol Index A2C:  28.7 ml/m2  LA Vol Index BP:   26.3 ml/m2  LA Area A4C:       19.3 cm2  LA Area A2C:       21.1 cm2  LA Major Axis A4C: 5.8  cm  LA Major Axis A2C: 5.7 cm  LA Volume Index:   24.6 ml/m2       RIGHT ATRIUM:                 Normal Ranges:  RA Vol A4C:        72.8 ml    (8.3-19.5ml)  RA Vol Index A4C:  31.5 ml/m2  RA Area A4C:       21.7 cm2  RA Major Axis A4C: 5.5 cm       AORTA MEASUREMENTS:         Normal Ranges:  Asc Ao, d:          3.20 cm (2.1-3.4cm)       LV SYSTOLIC FUNCTION:                       Normal Ranges:  EF-A4C View:    58 % (>=55%)  EF-A2C View:    55 %  EF-Biplane:     57 %  EF-Visual:      55 %  LV EF Reported: 55 %       LV DIASTOLIC FUNCTION:           Normal Ranges:  MV Peak E:             0.71 m/s  (0.7-1.2 m/s)  MV Peak A:             0.66 m/s  (0.42-0.7 m/s)  E/A Ratio:             1.08      (1.0-2.2)  MV e'                  0.077 m/s (>8.0)  MV lateral e'          0.09 m/s  MV medial e'           0.06 m/s  E/e' Ratio:            9.18      (<8.0)       MITRAL VALVE:          Normal Ranges:  MV DT:        293 msec (150-240msec)       AORTIC VALVE:                     Normal Ranges:  AoV Vmax:                1.16 m/s (<=1.7m/s)  AoV Peak P.4 mmHg (<20mmHg)  AoV Mean PG:             3.0 mmHg (1.7-11.5mmHg)  LVOT Max Abhi:            0.81 m/s (<=1.1m/s)  AoV VTI:                 33.00 cm (18-25cm)  LVOT VTI:                24.30 cm  LVOT Diameter:           2.00 cm  (1.8-2.4cm)  AoV Area, VTI:           2.31 cm2 (2.5-5.5cm2)  AoV Area,Vmax:           2.19 cm2 (2.5-4.5cm2)  AoV Dimensionless Index: 0.74       RIGHT VENTRICLE:  RV Basal 4.13 cm  RV Mid   4.48 cm  RV Major 7.5 cm  TAPSE:   16.4 mm  RV s'    0.09 m/s       TRICUSPID VALVE/RVSP:          Normal Ranges:  Peak TR Velocity:     2.85 m/s  RV Syst Pressure:     35 mmHg  (< 30mmHg)       PULMONIC VALVE:          Normal Ranges:  PV Accel Time:  90 msec  (>120ms)  PV Max Abhi:     0.8 m/s  (0.6-0.9m/s)  PV Max P.5 mmHg       [id number] Gavin Paulino DO  Electronically signed on 2025 at 10:41:02 AM       Wall Scoring       ** Final  **      Physical Exam    Relevant Results               Assessment/Plan   This patient currently has cardiac telemetry ordered; if you would like to modify or discontinue the telemetry order, click here to go to the orders activity to modify/discontinue the order.    Assessment & Plan  Arrhythmia    AV block, Mobitz 2  Seen and examined   Clinically stable   No complaints   No chest pain   No dizziness   Troponin x 3 were negative   Echocardiogram  EP consult  Morbid obesity (Multi)  Lifestyle modification  Hyperlipidemia  Lipitor   Diabetes type 2, controlled (Multi)  Accu-Chek ACHS  Insulin sliding scale  Hypoglycemia protocol    DVT prophylaxis Lovenox daily  Chronic venous insufficiency  Leg elevation   Compression stocking   Wound care for foot ulcer                     Linda Brooks MD

## 2025-02-26 NOTE — CONSULTS
Consults  History Of Present Illness:    Sanya Crowley is a 79 y.o. male presenting with near syncope.  He has visual changes, dizziness, and near syncope over the past few weeks.  He has not had anneliese syncope.     ECG on telemetry show marked sinus bradycardia 29 bpm, intermittent heart block, intermittent high-grade AV block, and 2-1 AV block.  He has associated dizziness in the hospital.    Last Recorded Vitals:  Vitals:    02/25/25 2259 02/26/25 0516 02/26/25 0713 02/26/25 1034   BP: 147/63 130/55 121/56 134/90   BP Location: Right arm Left arm Left arm Left arm   Patient Position: Lying Lying Lying Sitting   Pulse: 54 57 52 51   Resp: 16 16 18 18   Temp: 35.5 °C (95.9 °F) 35.7 °C (96.3 °F) 35.5 °C (95.9 °F) 35.4 °C (95.7 °F)   TempSrc: Temporal Temporal Temporal Temporal   SpO2: 97% 98% 96% 97%   Weight:       Height:           Last Labs:  CBC - 2/25/2025:  3:45 PM  5.2 10.9 135    34.3      CMP - 2/26/2025:  4:07 AM  9.0 6.6 28 --- 0.4   _ 3.6 21 67      PTT - 2/25/2025:  3:45 PM  1.1   11.7 34     Troponin I, High Sensitivity   Date/Time Value Ref Range Status   02/25/2025 05:29 PM 4 0 - 20 ng/L Final   02/25/2025 03:45 PM 4 0 - 20 ng/L Final     BNP   Date/Time Value Ref Range Status   02/25/2025 03:45  (H) 0 - 99 pg/mL Final   09/22/2019 05:25 PM 43 0 - 99 pg/mL Final     Comment:     .  <100 pg/mL - Heart failure unlikely  100-299 pg/mL - Intermediate probability of acute heart  .               failure exacerbation. Correlate with clinical  .               context and patient history.    >=300 pg/mL - Heart Failure likely. Correlate with clinical  .               context and patient history.  BNP testing is performed using different testing   methodology at [facility] than at other   [facility] hospitals. Direct result comparisons should   only be made within the same method.       Hemoglobin A1C   Date/Time Value Ref Range Status   09/23/2019 08:55 AM 6.6 % Final     Comment:           Diagnosis of Diabetes-Adults   Non-Diabetic: < or = 5.6%   Increased risk for developing diabetes: 5.7-6.4%   Diagnostic of diabetes: > or = 6.5%  .       Monitoring of Diabetes                Age (y)     Therapeutic Goal (%)   Adults:          >18           <7.0   Pediatrics:    13-18           <7.5                   7-12           <8.0                   0- 6            7.5-8.5   American Diabetes Association. Diabetes Care 33(S1), Jan 2010.        Last I/O:  No intake/output data recorded.    Past Cardiology Tests (Last 3 Years):  EKG:  ECG 12 lead 02/25/2025 (Preliminary)    Echo:  Transthoracic Echo (TTE) Complete 02/26/2025    Ejection Fractions:  EF   Date/Time Value Ref Range Status   02/26/2025 09:35 AM 55 %      Cath:  No results found for this or any previous visit from the past 1095 days.    Stress Test:  No results found for this or any previous visit from the past 1095 days.    Cardiac Imaging:  No results found for this or any previous visit from the past 1095 days.      Past Medical History:  See list    Past Surgical History:  See list      Social History:  He reports that he has never smoked. He has never used smokeless tobacco. He reports that he does not use drugs. No history on file for alcohol use.    Family History:  His brother has a defibrillator     Allergies:  Patient has no known allergies.    Inpatient Medications:  Scheduled medications   Medication Dose Route Frequency    atorvastatin  10 mg oral Nightly    enoxaparin  40 mg subcutaneous q12h DONNIE    gabapentin  300 mg oral BID    insulin lispro  0-10 Units subcutaneous TID AC    multivitamin with minerals  1 tablet oral Daily    polyethylene glycol  17 g oral Daily    tamsulosin  0.4 mg oral Daily    vancomycin  2,000 mg intravenous Once     PRN medications   Medication    acetaminophen    Or    acetaminophen    Or    acetaminophen    albuterol    dextrose    dextrose    glucagon    glucagon    ipratropium    lubricating eye drops     melatonin    ondansetron    Or    ondansetron     Continuous Medications   Medication Dose Last Rate     Outpatient Medications:  Current Outpatient Medications   Medication Instructions    albuterol 90 mcg/actuation inhaler 2 puffs, inhalation, Every 6 hours PRN    amoxicillin-pot clavulanate (Augmentin) 875-125 mg tablet 875 mg, oral, 2 times daily    atorvastatin (LIPITOR) 10 mg, oral, Daily    cephalexin (KEFLEX) 500 mg, oral, 4 times daily    cholecalciferol (Vitamin D-3) 25 MCG (1000 UT) capsule 3 capsules, oral, Daily    desonide (DesOwen) 0.05 % cream 2 times daily PRN    doxycycline (ADOXA) 100 mg, oral, 2 times daily, Take with a full glass of water and do not lie down for at least 30 minutes after    gabapentin (NEURONTIN) 300 mg    ipratropium (Atrovent) 42 mcg (0.06 %) nasal spray 2 sprays, 3 times daily PRN    lubricating eye drops ophthalmic solution 1 drop, 4 times daily PRN    multivitamin with minerals iron-free (Centrum Silver) 1 tablet, oral, Daily    tamsulosin (FLOMAX) 0.4 mg, oral, Daily    vit C/E/zinc ox/gladys/lut/zeax (ICAPS AREDS2 ORAL) 1 capsule, oral, 2 times daily     Review of Systems   Constitutional: Positive for malaise/fatigue.   Cardiovascular:  Positive for irregular heartbeat, near-syncope and palpitations. Negative for chest pain and syncope.   Neurological:  Positive for dizziness and light-headedness.   All other systems reviewed and are negative.      Physical Exam:    General  No acute distress  HEENT  EOMI  Dry mucous membranes  Neck  Trachea midline  Cardiovascular  .Inspection:  JVD: none with patient sitting at 90 degrees  Precordial bulge: none  Palpation:   Quality: Normal  Precordium: normal PMI  Auscultation:  Quality of auscultation: Normal  S1: normal intensity  S2: normal intensity  Occasional irregular rhythm  Clicks: none  Gallops: none  Rub: none  Systolic murmurs: none  Diastolic murmurs: none  Pulses:  2+= radial, brachial, carotid  Lungs  No  wheezing  Abdomen  Bowel sounds positive  Extremities   Multiple skin tears and ecchymoses of left arm  Neuro  Oriented x 3  Gait not tested       Assessment/Plan     Peripheral IV 02/25/25 20 G Distal;Right Forearm (Active)   Site Assessment Clean;Intact;Dry 02/26/25 1113   Dressing Type Transparent 02/26/25 1113   Line Status Blood return noted 02/26/25 1113   Cap Change (needleless connector) Cap changed 02/26/25 1113   Dressing Status Clean;Dry 02/26/25 1113   Dressing Intervention Other (Comment) 02/26/25 1113   Number of days: 1       Code Status:  Full Code    Syncope, dizziness, and fatigue associated with complete heart block, high-grade AV block, 2-1 AV block, marked sinus bradycardia.  QRS escape rhythm with 100 ms duration.  LVEF 55%.  Preoperative cardiac evaluation performed.  Shared decision making discussed.  [place] decision tool used.  Treatment options, risk, benefits, and imponderables discussed.  All questions answered.  E consent obtained.    Counseling over 50% of the visit performed.  The patient and I discussed family history, ECG, telemetry, near-syncope, syncope, normal conduction, what is normal range for heart rate, what is complete heart block, potential etiologies of bradycardia, potential future cardiac evaluation, informed consent, treatment options, risk, benefits, and imponderables.  All questions answered.  Patient appreciative care  I spent 90 minutes in the professional and overall care of this patient.        Meche Farrell MD

## 2025-02-26 NOTE — ASSESSMENT & PLAN NOTE
Seen and examined   Clinically stable   No complaints   No chest pain   No dizziness   Troponin x 3 were negative   Echocardiogram  EP consult

## 2025-02-26 NOTE — PROGRESS NOTES
02/26/25 1226   Discharge Planning   Living Arrangements Spouse/significant other   Support Systems Spouse/significant other;Children   Assistance Needed PTA - reports none at baseline, independent.   Type of Residence Private residence   Do you have animals or pets at home? No   Home or Post Acute Services None   Expected Discharge Disposition Home   Does the patient need discharge transport arranged? No   Intensity of Service   Intensity of Service 0-30 min     Sent by PCP for dizziness and slurred speech. Found with Mobitz 2 heart block, EP consult pending. Possible need for PPM placement.

## 2025-02-26 NOTE — NURSING NOTE
Patient arrived from EP lab, s/p PPM to right chest. Aquacel dressing is CDI, immobilizer in place and ice pack applied to site. Left chest incision closed with an aquacel and ice pack applied to site.  Patient is A&Ox4 and has no c/o at this time. Tele monitor applied and VSS. Patient is taking nourishment without difficulty with family present at bedside. Will continue to monitor.

## 2025-02-26 NOTE — H&P (VIEW-ONLY)
Consults  History Of Present Illness:    Sanya Crowley is a 79 y.o. male presenting with near syncope.  He has visual changes, dizziness, and near syncope over the past few weeks.  He has not had anneliese syncope.     ECG on telemetry show marked sinus bradycardia 29 bpm, intermittent heart block, intermittent high-grade AV block, and 2-1 AV block.  He has associated dizziness in the hospital.    Last Recorded Vitals:  Vitals:    02/25/25 2259 02/26/25 0516 02/26/25 0713 02/26/25 1034   BP: 147/63 130/55 121/56 134/90   BP Location: Right arm Left arm Left arm Left arm   Patient Position: Lying Lying Lying Sitting   Pulse: 54 57 52 51   Resp: 16 16 18 18   Temp: 35.5 °C (95.9 °F) 35.7 °C (96.3 °F) 35.5 °C (95.9 °F) 35.4 °C (95.7 °F)   TempSrc: Temporal Temporal Temporal Temporal   SpO2: 97% 98% 96% 97%   Weight:       Height:           Last Labs:  CBC - 2/25/2025:  3:45 PM  5.2 10.9 135    34.3      CMP - 2/26/2025:  4:07 AM  9.0 6.6 28 --- 0.4   _ 3.6 21 67      PTT - 2/25/2025:  3:45 PM  1.1   11.7 34     Troponin I, High Sensitivity   Date/Time Value Ref Range Status   02/25/2025 05:29 PM 4 0 - 20 ng/L Final   02/25/2025 03:45 PM 4 0 - 20 ng/L Final     BNP   Date/Time Value Ref Range Status   02/25/2025 03:45  (H) 0 - 99 pg/mL Final   09/22/2019 05:25 PM 43 0 - 99 pg/mL Final     Comment:     .  <100 pg/mL - Heart failure unlikely  100-299 pg/mL - Intermediate probability of acute heart  .               failure exacerbation. Correlate with clinical  .               context and patient history.    >=300 pg/mL - Heart Failure likely. Correlate with clinical  .               context and patient history.  BNP testing is performed using different testing   methodology at [facility] than at other   [facility] hospitals. Direct result comparisons should   only be made within the same method.       Hemoglobin A1C   Date/Time Value Ref Range Status   09/23/2019 08:55 AM 6.6 % Final     Comment:           Diagnosis of Diabetes-Adults   Non-Diabetic: < or = 5.6%   Increased risk for developing diabetes: 5.7-6.4%   Diagnostic of diabetes: > or = 6.5%  .       Monitoring of Diabetes                Age (y)     Therapeutic Goal (%)   Adults:          >18           <7.0   Pediatrics:    13-18           <7.5                   7-12           <8.0                   0- 6            7.5-8.5   American Diabetes Association. Diabetes Care 33(S1), Jan 2010.        Last I/O:  No intake/output data recorded.    Past Cardiology Tests (Last 3 Years):  EKG:  ECG 12 lead 02/25/2025 (Preliminary)    Echo:  Transthoracic Echo (TTE) Complete 02/26/2025    Ejection Fractions:  EF   Date/Time Value Ref Range Status   02/26/2025 09:35 AM 55 %      Cath:  No results found for this or any previous visit from the past 1095 days.    Stress Test:  No results found for this or any previous visit from the past 1095 days.    Cardiac Imaging:  No results found for this or any previous visit from the past 1095 days.      Past Medical History:  See list    Past Surgical History:  See list      Social History:  He reports that he has never smoked. He has never used smokeless tobacco. He reports that he does not use drugs. No history on file for alcohol use.    Family History:  His brother has a defibrillator     Allergies:  Patient has no known allergies.    Inpatient Medications:  Scheduled medications   Medication Dose Route Frequency    atorvastatin  10 mg oral Nightly    enoxaparin  40 mg subcutaneous q12h DONNIE    gabapentin  300 mg oral BID    insulin lispro  0-10 Units subcutaneous TID AC    multivitamin with minerals  1 tablet oral Daily    polyethylene glycol  17 g oral Daily    tamsulosin  0.4 mg oral Daily    vancomycin  2,000 mg intravenous Once     PRN medications   Medication    acetaminophen    Or    acetaminophen    Or    acetaminophen    albuterol    dextrose    dextrose    glucagon    glucagon    ipratropium    lubricating eye drops     melatonin    ondansetron    Or    ondansetron     Continuous Medications   Medication Dose Last Rate     Outpatient Medications:  Current Outpatient Medications   Medication Instructions    albuterol 90 mcg/actuation inhaler 2 puffs, inhalation, Every 6 hours PRN    amoxicillin-pot clavulanate (Augmentin) 875-125 mg tablet 875 mg, oral, 2 times daily    atorvastatin (LIPITOR) 10 mg, oral, Daily    cephalexin (KEFLEX) 500 mg, oral, 4 times daily    cholecalciferol (Vitamin D-3) 25 MCG (1000 UT) capsule 3 capsules, oral, Daily    desonide (DesOwen) 0.05 % cream 2 times daily PRN    doxycycline (ADOXA) 100 mg, oral, 2 times daily, Take with a full glass of water and do not lie down for at least 30 minutes after    gabapentin (NEURONTIN) 300 mg    ipratropium (Atrovent) 42 mcg (0.06 %) nasal spray 2 sprays, 3 times daily PRN    lubricating eye drops ophthalmic solution 1 drop, 4 times daily PRN    multivitamin with minerals iron-free (Centrum Silver) 1 tablet, oral, Daily    tamsulosin (FLOMAX) 0.4 mg, oral, Daily    vit C/E/zinc ox/gladys/lut/zeax (ICAPS AREDS2 ORAL) 1 capsule, oral, 2 times daily     Review of Systems   Constitutional: Positive for malaise/fatigue.   Cardiovascular:  Positive for irregular heartbeat, near-syncope and palpitations. Negative for chest pain and syncope.   Neurological:  Positive for dizziness and light-headedness.   All other systems reviewed and are negative.      Physical Exam:    General  No acute distress  HEENT  EOMI  Dry mucous membranes  Neck  Trachea midline  Cardiovascular  .Inspection:  JVD: none with patient sitting at 90 degrees  Precordial bulge: none  Palpation:   Quality: Normal  Precordium: normal PMI  Auscultation:  Quality of auscultation: Normal  S1: normal intensity  S2: normal intensity  Occasional irregular rhythm  Clicks: none  Gallops: none  Rub: none  Systolic murmurs: none  Diastolic murmurs: none  Pulses:  2+= radial, brachial, carotid  Lungs  No  wheezing  Abdomen  Bowel sounds positive  Extremities   Multiple skin tears and ecchymoses of left arm  Neuro  Oriented x 3  Gait not tested       Assessment/Plan     Peripheral IV 02/25/25 20 G Distal;Right Forearm (Active)   Site Assessment Clean;Intact;Dry 02/26/25 1113   Dressing Type Transparent 02/26/25 1113   Line Status Blood return noted 02/26/25 1113   Cap Change (needleless connector) Cap changed 02/26/25 1113   Dressing Status Clean;Dry 02/26/25 1113   Dressing Intervention Other (Comment) 02/26/25 1113   Number of days: 1       Code Status:  Full Code    Syncope, dizziness, and fatigue associated with complete heart block, high-grade AV block, 2-1 AV block, marked sinus bradycardia.  QRS escape rhythm with 100 ms duration.  LVEF 55%.  Preoperative cardiac evaluation performed.  Shared decision making discussed.  [place] decision tool used.  Treatment options, risk, benefits, and imponderables discussed.  All questions answered.  E consent obtained.    Counseling over 50% of the visit performed.  The patient and I discussed family history, ECG, telemetry, near-syncope, syncope, normal conduction, what is normal range for heart rate, what is complete heart block, potential etiologies of bradycardia, potential future cardiac evaluation, informed consent, treatment options, risk, benefits, and imponderables.  All questions answered.  Patient appreciative care  I spent 90 minutes in the professional and overall care of this patient.        Meche Farrell MD

## 2025-02-26 NOTE — NURSING NOTE
Patient prepared for EP lab, wife called, patient consented with surgical team at bedside. New IV started, vanco administered per EP request.

## 2025-02-27 ENCOUNTER — APPOINTMENT (OUTPATIENT)
Dept: CARDIOLOGY | Facility: HOSPITAL | Age: 80
End: 2025-02-27
Payer: MEDICARE

## 2025-02-27 VITALS
BODY MASS INDEX: 40.47 KG/M2 | RESPIRATION RATE: 18 BRPM | OXYGEN SATURATION: 95 % | HEART RATE: 62 BPM | WEIGHT: 267 LBS | SYSTOLIC BLOOD PRESSURE: 124 MMHG | TEMPERATURE: 95.7 F | DIASTOLIC BLOOD PRESSURE: 59 MMHG | HEIGHT: 68 IN

## 2025-02-27 DIAGNOSIS — Z95.0 PACEMAKER: Primary | ICD-10-CM

## 2025-02-27 DIAGNOSIS — I44.1 ATRIOVENTRICULAR BLOCK, MOBITZ TYPE 2: ICD-10-CM

## 2025-02-27 LAB
ANION GAP SERPL CALC-SCNC: 10 MMOL/L (ref 10–20)
BUN SERPL-MCNC: 23 MG/DL (ref 6–23)
CALCIUM SERPL-MCNC: 9.1 MG/DL (ref 8.6–10.3)
CHLORIDE SERPL-SCNC: 107 MMOL/L (ref 98–107)
CO2 SERPL-SCNC: 28 MMOL/L (ref 21–32)
CREAT SERPL-MCNC: 0.87 MG/DL (ref 0.5–1.3)
EGFRCR SERPLBLD CKD-EPI 2021: 87 ML/MIN/1.73M*2
GLUCOSE BLD MANUAL STRIP-MCNC: 108 MG/DL (ref 74–99)
GLUCOSE BLD MANUAL STRIP-MCNC: 80 MG/DL (ref 74–99)
GLUCOSE BLD MANUAL STRIP-MCNC: 99 MG/DL (ref 74–99)
GLUCOSE SERPL-MCNC: 83 MG/DL (ref 74–99)
MAGNESIUM SERPL-MCNC: 1.84 MG/DL (ref 1.6–2.4)
POTASSIUM SERPL-SCNC: 4.3 MMOL/L (ref 3.5–5.3)
SODIUM SERPL-SCNC: 141 MMOL/L (ref 136–145)

## 2025-02-27 PROCEDURE — 80048 BASIC METABOLIC PNL TOTAL CA: CPT | Performed by: HOSPITALIST

## 2025-02-27 PROCEDURE — 82947 ASSAY GLUCOSE BLOOD QUANT: CPT

## 2025-02-27 PROCEDURE — 2500000001 HC RX 250 WO HCPCS SELF ADMINISTERED DRUGS (ALT 637 FOR MEDICARE OP): Performed by: NURSE PRACTITIONER

## 2025-02-27 PROCEDURE — 2500000004 HC RX 250 GENERAL PHARMACY W/ HCPCS (ALT 636 FOR OP/ED): Performed by: NURSE PRACTITIONER

## 2025-02-27 PROCEDURE — 83735 ASSAY OF MAGNESIUM: CPT | Performed by: HOSPITALIST

## 2025-02-27 PROCEDURE — 93280 PM DEVICE PROGR EVAL DUAL: CPT | Performed by: INTERNAL MEDICINE

## 2025-02-27 PROCEDURE — 99239 HOSP IP/OBS DSCHRG MGMT >30: CPT | Performed by: HOSPITALIST

## 2025-02-27 PROCEDURE — 2500000001 HC RX 250 WO HCPCS SELF ADMINISTERED DRUGS (ALT 637 FOR MEDICARE OP): Performed by: HOSPITALIST

## 2025-02-27 PROCEDURE — 2500000002 HC RX 250 W HCPCS SELF ADMINISTERED DRUGS (ALT 637 FOR MEDICARE OP, ALT 636 FOR OP/ED): Performed by: HOSPITALIST

## 2025-02-27 PROCEDURE — 36415 COLL VENOUS BLD VENIPUNCTURE: CPT | Performed by: HOSPITALIST

## 2025-02-27 PROCEDURE — 93286 PERI-PX EVAL PM/LDLS PM IP: CPT | Performed by: INTERNAL MEDICINE

## 2025-02-27 PROCEDURE — 93280 PM DEVICE PROGR EVAL DUAL: CPT

## 2025-02-27 PROCEDURE — 2500000004 HC RX 250 GENERAL PHARMACY W/ HCPCS (ALT 636 FOR OP/ED): Performed by: HOSPITALIST

## 2025-02-27 RX ORDER — LANOLIN ALCOHOL/MO/W.PET/CERES
400 CREAM (GRAM) TOPICAL ONCE
Status: COMPLETED | OUTPATIENT
Start: 2025-02-27 | End: 2025-02-27

## 2025-02-27 RX ORDER — ACETAMINOPHEN 325 MG/1
650 TABLET ORAL EVERY 6 HOURS PRN
Start: 2025-02-27

## 2025-02-27 RX ORDER — TRAMADOL HYDROCHLORIDE 50 MG/1
50 TABLET ORAL EVERY 6 HOURS PRN
Qty: 10 TABLET | Refills: 0 | Status: SHIPPED | OUTPATIENT
Start: 2025-02-27

## 2025-02-27 RX ADMIN — Medication 1 TABLET: at 08:29

## 2025-02-27 RX ADMIN — GABAPENTIN 300 MG: 300 CAPSULE ORAL at 08:29

## 2025-02-27 RX ADMIN — MAGNESIUM GLUCONATE 500 MG ORAL TABLET 400 MG: 500 TABLET ORAL at 09:38

## 2025-02-27 RX ADMIN — TRAMADOL HYDROCHLORIDE 50 MG: 50 TABLET, FILM COATED ORAL at 11:52

## 2025-02-27 RX ADMIN — POLYETHYLENE GLYCOL 3350 17 G: 17 POWDER, FOR SOLUTION ORAL at 08:29

## 2025-02-27 RX ADMIN — TAMSULOSIN HYDROCHLORIDE 0.4 MG: 0.4 CAPSULE ORAL at 08:29

## 2025-02-27 ASSESSMENT — COGNITIVE AND FUNCTIONAL STATUS - GENERAL
TOILETING: A LITTLE
WALKING IN HOSPITAL ROOM: A LITTLE
MOVING TO AND FROM BED TO CHAIR: A LITTLE
CLIMB 3 TO 5 STEPS WITH RAILING: A LITTLE
PERSONAL GROOMING: A LITTLE
DRESSING REGULAR UPPER BODY CLOTHING: A LITTLE
EATING MEALS: A LITTLE
MOVING FROM LYING ON BACK TO SITTING ON SIDE OF FLAT BED WITH BEDRAILS: A LITTLE
DAILY ACTIVITIY SCORE: 19
DRESSING REGULAR LOWER BODY CLOTHING: A LITTLE
TURNING FROM BACK TO SIDE WHILE IN FLAT BAD: A LITTLE
STANDING UP FROM CHAIR USING ARMS: A LITTLE
MOBILITY SCORE: 18

## 2025-02-27 ASSESSMENT — PAIN DESCRIPTION - LOCATION: LOCATION: INCISION

## 2025-02-27 ASSESSMENT — PAIN SCALES - GENERAL
PAINLEVEL_OUTOF10: 4
PAINLEVEL_OUTOF10: 0 - NO PAIN
PAINLEVEL_OUTOF10: 0 - NO PAIN

## 2025-02-27 ASSESSMENT — PAIN - FUNCTIONAL ASSESSMENT: PAIN_FUNCTIONAL_ASSESSMENT: 0-10

## 2025-02-27 NOTE — CARE PLAN
The patient's goals for the shift include      The clinical goals for the shift include pt will remain Free from injury throughout shift

## 2025-02-27 NOTE — PROGRESS NOTES
02/27/25 1116   Discharge Planning   Expected Discharge Disposition Home     POD 1 for dual-chamber PPM placement with Dr Farrell. EP cleared pt for dc. HOME no new needs.    UPDATE [id number]:  Pt's son will pick him up and bring him home. Pt's wife stated pt is a [facility] and would like [facility] to reach out to Sonam READ at [facility] regarding setting up for HHA needs at home. [facility] spoke with Sonam ([phone] ext [phone]) who sent forms to [facility] to complete for HHA to be set up for pt. [facility] completed and returned them. Via Email.

## 2025-02-27 NOTE — PROGRESS NOTES
Sanya Crowley is a 79 y.o. male on day 2 of admission presenting with Arrhythmia.    Subjective   Patient status post Medtronic  [place] XT DR GAYTAN dual-chamber pacemaker placement with a right sided approach 2/27/2025 (initial left-sided approach was abandoned, see report for summary) performed by Dr. Farrell  Review of telemetry reveals atrial paced rhythm  Device check performed this morning presenting rhythm a paced/V paced with rates in the 60s.  Lead sensing, capture, and threshold, and lead impedances within normal limits    Patient denies lightheadedness, dizziness but admits he has not been out of bed today.  Denies pain at right chest pacemaker site or left chest tempted access site, arm immobilizer and ice pack are in place.  Aquacel dressings to right and left chest are intact, no swelling noted at either site       Objective     Physical Exam  Constitutional:       General: He is not in acute distress.     Appearance: He is obese.   HENT:      Head: Normocephalic.   Neck:      Vascular: No carotid bruit.   Cardiovascular:      Rate and Rhythm: Normal rate and regular rhythm.      Pulses: Normal pulses.      Heart sounds: Normal heart sounds. No murmur heard.     Comments: Aquacel dressing to right chest pacemaker site dry and intact with no swelling.  Arm immobilizer and ice pack are in place  Left chest Aquacel dressing dry and intact with no swelling  2+ radial pulse bilateral, no lower extremity edema  Pulmonary:      Effort: Pulmonary effort is normal.      Breath sounds: Normal breath sounds.   Abdominal:      General: Bowel sounds are normal.      Palpations: Abdomen is soft.   Skin:     General: Skin is warm and dry.      Comments: Multiple ecchymotic areas to arms   Neurological:      Mental Status: He is alert and oriented to person, place, and time.   Psychiatric:         Mood and Affect: Mood normal.         Behavior: Behavior normal.         Last Recorded Vitals  Blood pressure 129/60, pulse 60,  "temperature 35.8 °C (96.4 °F), resp. rate 18, height 1.727 m (5' 8\"), weight 121 kg (267 lb), SpO2 95%.  Intake/Output last 3 Shifts:  I/O last 3 completed shifts:  In: 740 (6.1 mL/kg) [P.O.:240; IV Piggyback:500]  Out: 705 (5.8 mL/kg) [Urine:625 (0.1 mL/kg/hr); Blood:80]  Weight: 121.1 kg     Relevant Results            This patient currently has cardiac telemetry ordered; if you would like to modify or discontinue the telemetry order, click here to go to the orders activity to modify/discontinue the order.      Results for orders placed or performed during the hospital encounter of 02/25/25 (from the past 96 hours)   CBC and Auto Differential   Result Value Ref Range    WBC 5.2 4.4 - 11.3 x10*3/uL    nRBC 0.0 0.0 - 0.0 /100 WBCs    RBC 3.75 (L) 4.50 - 5.90 x10*6/uL    Hemoglobin 10.9 (L) 13.5 - 17.5 g/dL    Hematocrit 34.3 (L) 41.0 - 52.0 %    MCV 92 80 - 100 fL    MCH 29.1 26.0 - 34.0 pg    MCHC 31.8 (L) 32.0 - 36.0 g/dL    RDW 15.5 (H) 11.5 - 14.5 %    Platelets 135 (L) 150 - 450 x10*3/uL    Neutrophils % 51.0 40.0 - 80.0 %    Immature Granulocytes %, Automated 0.2 0.0 - 0.9 %    Lymphocytes % 35.0 13.0 - 44.0 %    Monocytes % 9.9 2.0 - 10.0 %    Eosinophils % 3.5 0.0 - 6.0 %    Basophils % 0.4 0.0 - 2.0 %    Neutrophils Absolute 2.63 1.60 - 5.50 x10*3/uL    Immature Granulocytes Absolute, Automated 0.01 0.00 - 0.50 x10*3/uL    Lymphocytes Absolute 1.80 0.80 - 3.00 x10*3/uL    Monocytes Absolute 0.51 0.05 - 0.80 x10*3/uL    Eosinophils Absolute 0.18 0.00 - 0.40 x10*3/uL    Basophils Absolute 0.02 0.00 - 0.10 x10*3/uL   Comprehensive Metabolic Panel   Result Value Ref Range    Glucose 108 (H) 74 - 99 mg/dL    Sodium 140 136 - 145 mmol/L    Potassium 4.4 3.5 - 5.3 mmol/L    Chloride 105 98 - 107 mmol/L    Bicarbonate 29 21 - 32 mmol/L    Anion Gap 10 10 - 20 mmol/L    Urea Nitrogen 36 (H) 6 - 23 mg/dL    Creatinine 0.98 0.50 - 1.30 mg/dL    eGFR 78 >60 mL/min/1.73m*2    Calcium 9.2 8.6 - 10.3 mg/dL    Albumin 3.6 " 3.4 - 5.0 g/dL    Alkaline Phosphatase 67 33 - 136 U/L    Total Protein 6.6 6.4 - 8.2 g/dL    AST 28 9 - 39 U/L    Bilirubin, Total 0.4 0.0 - 1.2 mg/dL    ALT 21 10 - 52 U/L   Magnesium   Result Value Ref Range    Magnesium 1.89 1.60 - 2.40 mg/dL   B-Type Natriuretic Peptide   Result Value Ref Range     (H) 0 - 99 pg/mL   Protime-INR   Result Value Ref Range    Protime 11.7 9.8 - 12.4 seconds    INR 1.1 0.9 - 1.1   aPTT   Result Value Ref Range    aPTT 34 26 - 36 seconds   Lactate   Result Value Ref Range    Lactate 0.6 0.4 - 2.0 mmol/L   D-Dimer, Quantitative VTE Exclusion   Result Value Ref Range    D-Dimer, Quantitative VTE Exclusion 552 (H) <=500 ng/mL FEU   Troponin I, High Sensitivity, Initial   Result Value Ref Range    Troponin I, High Sensitivity 4 0 - 20 ng/L   Sars-CoV-2 and Influenza A/B PCR   Result Value Ref Range    Flu A Result Not Detected Not Detected    Flu B Result Not Detected Not Detected    Coronavirus 2019, PCR Not Detected Not Detected   ECG 12 lead   Result Value Ref Range    Ventricular Rate 29 BPM    Atrial Rate 44 BPM    QRS Duration 106 ms    QT Interval 524 ms    QTC Calculation(Bazett) 364 ms    R Axis -11 degrees    T Axis 39 degrees    QRS Count 5 beats    Q Onset 216 ms    T Offset 478 ms    QTC Fredericia 411 ms   Troponin, High Sensitivity, 1 Hour   Result Value Ref Range    Troponin I, High Sensitivity 4 0 - 20 ng/L   POCT GLUCOSE   Result Value Ref Range    POCT Glucose 106 (H) 74 - 99 mg/dL   Basic metabolic panel   Result Value Ref Range    Glucose 68 (L) 74 - 99 mg/dL    Sodium 141 136 - 145 mmol/L    Potassium 4.2 3.5 - 5.3 mmol/L    Chloride 107 98 - 107 mmol/L    Bicarbonate 28 21 - 32 mmol/L    Anion Gap 10 10 - 20 mmol/L    Urea Nitrogen 33 (H) 6 - 23 mg/dL    Creatinine 0.94 0.50 - 1.30 mg/dL    eGFR 82 >60 mL/min/1.73m*2    Calcium 9.0 8.6 - 10.3 mg/dL   Lavender Top   Result Value Ref Range    Extra Tube Hold for add-ons.    SST TOP   Result Value Ref Range     Extra Tube Hold for add-ons.    POCT GLUCOSE   Result Value Ref Range    POCT Glucose 69 (L) 74 - 99 mg/dL   Transthoracic Echo (TTE) Complete   Result Value Ref Range    AV mn grad 3 mmHg    AV pk caprice 1.16 m/s    LV Biplane EF 57 %    LVOT diam 2.00 cm    MV E/A ratio 1.08     Tricuspid annular plane systolic excursion 1.6 cm    LA vol index A/L 26.3 ml/m2    LV EF 55 %    RV free wall pk S' 9.28 cm/s    RVSP 35.5 mmHg    LVIDd 5.05 cm    Aortic Valve Area by Continuity of Peak Velocity 2.19 cm2    AV pk grad 5 mmHg    Aortic Valve Area by Continuity of VTI 2.31 cm2    LV A4C EF 57.8    POCT GLUCOSE   Result Value Ref Range    POCT Glucose 92 74 - 99 mg/dL   ECG 12 lead STAT   Result Value Ref Range    Ventricular Rate 60 BPM    Atrial Rate 468 BPM    MI Interval 168 ms    QRS Duration 182 ms    QT Interval 508 ms    QTC Calculation(Bazett) 508 ms    R Axis -83 degrees    T Axis 47 degrees    QRS Count 10 beats    Q Onset 181 ms    T Offset 435 ms    QTC Fredericia 508 ms   POCT GLUCOSE   Result Value Ref Range    POCT Glucose 156 (H) 74 - 99 mg/dL   Basic Metabolic Panel   Result Value Ref Range    Glucose 83 74 - 99 mg/dL    Sodium 141 136 - 145 mmol/L    Potassium 4.3 3.5 - 5.3 mmol/L    Chloride 107 98 - 107 mmol/L    Bicarbonate 28 21 - 32 mmol/L    Anion Gap 10 10 - 20 mmol/L    Urea Nitrogen 23 6 - 23 mg/dL    Creatinine 0.87 0.50 - 1.30 mg/dL    eGFR 87 >60 mL/min/1.73m*2    Calcium 9.1 8.6 - 10.3 mg/dL   Magnesium   Result Value Ref Range    Magnesium 1.84 1.60 - 2.40 mg/dL   POCT GLUCOSE   Result Value Ref Range    POCT Glucose 80 74 - 99 mg/dL   Cardiac device check - Inpatient   Result Value Ref Range    BSA 2.41 m2   POCT GLUCOSE   Result Value Ref Range    POCT Glucose 108 (H) 74 - 99 mg/dL               Assessment/Plan   Assessment & Plan  Arrhythmia    AV block, Mobitz 2    Morbid obesity (Multi)    Hyperlipidemia    Diabetes type 2, controlled (Multi)    Chronic venous  insufficiency    Bradycardia    Near syncope, fatigue, dizziness associated with complete heart block, high degree AV block, 2-1 AV block, and marked sinus bradycardia now status post dual-chamber Medtronic pacemaker (right sided device) Dr. Farrell 2/27/2025 reviewed postprocedure activity restrictions, incision care, EP follow-up with wound check in 1 week and 90-day EP follow-up with chest x-ray/device check/Dr. Farrell with patient who verbalized understanding.  His questions were answered.  Patient may be discharged from EP standpoint, follow-up has been arranged       I spent 35 minutes in the professional and overall care of this patient.      Martha Roach, APRN-CNP

## 2025-02-27 NOTE — DISCHARGE SUMMARY
Discharge Diagnosis  Arrhythmia    Issues Requiring Follow-Up  Follow with EP cardiology     Discharge Meds     Medication List      START taking these medications     acetaminophen 325 mg tablet; Commonly known as: Tylenol; Take 2 tablets   (650 mg) by mouth every 6 hours if needed for mild pain (1 - 3). As needed   for pacemaker site incision pain   traMADol 50 mg tablet; Commonly known as: Ultram; Take 1 tablet (50 mg)   by mouth every 6 hours if needed for moderate pain (4 - 6). If Tylenol not   effective     CONTINUE taking these medications     albuterol 90 mcg/actuation inhaler   amoxicillin-pot clavulanate 875-125 mg tablet; Commonly known as:   Augmentin   atorvastatin 10 mg tablet; Commonly known as: Lipitor   cephalexin 500 mg capsule; Commonly known as: Keflex   cholecalciferol 25 MCG (1000 UT) capsule; Commonly known as: Vitamin D-3   desonide 0.05 % cream; Commonly known as: DesOwen   doxycycline 100 mg tablet; Commonly known as: Adoxa   gabapentin 300 mg capsule; Commonly known as: Neurontin   ICAPS AREDS2 ORAL   ipratropium 42 mcg (0.06 %) nasal spray; Commonly known as: Atrovent   lubricating eye drops ophthalmic solution   multivitamin with minerals iron-free; Commonly known as: Centrum Silver   tamsulosin 0.4 mg 24 hr capsule; Commonly known as: Flomax       Test Results Pending At Discharge  Pending Labs       No current pending labs.            Hospital Course     Sanya Crowley is a 79 y.o. male with history of morbid obesity, diabetes type 2, hyperlipidemia, chronic venous insufficiency, with right second toe wound who presented to emergency room with dizziness and some slurred speech.  Patient was seen at his PCP office today.  Patient was sent by his PCP for dizziness and they noticed the he has some slurred speech.  He was feeling off balance.  He did not have any upper extremity or lower extremity weakness.  He denies having headache.  His vision was a little blurry.  No syncopal episode.   No seizure activities.  No numbness tingling upper extremity or lower extremity.  No facial droops.  No previous history of stroke.  No history of coronary disease.  No history of stroke.  No history of coronary artery disease.  No history of sleep apnea.  He denies having nausea vomiting.  No chest pain.  No shortness of breath.  No orthopnea or PND.  He has been having a bilateral leg edema.  He denies having dysuria, hematuria, or frequency.     A/P     AV block, Mobitz 2  Seen and examined   Clinically stable   No complaints   No chest pain   No dizziness   Troponin x 3 were negative   Echocardiogram  EP consult    Morbid obesity (Multi)  Lifestyle modification    Hyperlipidemia  Lipitor     Diabetes type 2, controlled (Multi)  Accu-Chek ACHS  Insulin sliding scale  Hypoglycemia protocol    Chronic venous insufficiency  Leg elevation   Compression stocking   Wound care for foot ulcer         Pertinent Physical Exam At Time of Discharge  Physical Exam  Constitutional:       Appearance: Normal appearance.   HENT:      Head: Normocephalic and atraumatic.      Mouth/Throat:      Mouth: Mucous membranes are moist.   Eyes:      Extraocular Movements: Extraocular movements intact.      Pupils: Pupils are equal, round, and reactive to light.   Cardiovascular:      Rate and Rhythm: Normal rate and regular rhythm.   Pulmonary:      Effort: Pulmonary effort is normal.      Breath sounds: Normal breath sounds.   Abdominal:      General: Abdomen is flat.      Palpations: Abdomen is soft.   Musculoskeletal:      Cervical back: Normal range of motion and neck supple.   Skin:     General: Skin is warm.   Neurological:      General: No focal deficit present.      Mental Status: He is alert and oriented to person, place, and time.   Psychiatric:         Mood and Affect: Mood normal.         Behavior: Behavior normal.     Outpatient Follow-Up  Future Appointments   Date Time Provider Department Center   3/5/2025  1:00 PM [facility]  [facility] [id number] [place]   6/3/2025  2:20 PM [facility] [id number] [place]   6/3/2025  3:20 PM Meche Farrell MD [id number] [place]     DC TIME >30 MIN     Linda Brooks MD

## 2025-02-28 LAB
ATRIAL RATE: 44 BPM
Q ONSET: 216 MS
QRS COUNT: 5 BEATS
QRS DURATION: 106 MS
QT INTERVAL: 524 MS
QTC CALCULATION(BAZETT): 364 MS
QTC FREDERICIA: 411 MS
R AXIS: -11 DEGREES
T AXIS: 39 DEGREES
T OFFSET: 478 MS
VENTRICULAR RATE: 29 BPM

## 2025-03-03 LAB
ATRIAL RATE: 468 BPM
PR INTERVAL: 168 MS
Q ONSET: 181 MS
QRS COUNT: 10 BEATS
QRS DURATION: 182 MS
QT INTERVAL: 508 MS
QTC CALCULATION(BAZETT): 508 MS
QTC FREDERICIA: 508 MS
R AXIS: -83 DEGREES
T AXIS: 47 DEGREES
T OFFSET: 435 MS
VENTRICULAR RATE: 60 BPM

## 2025-03-05 ENCOUNTER — APPOINTMENT (OUTPATIENT)
Dept: CARDIOLOGY | Facility: CLINIC | Age: 80
End: 2025-03-05
Payer: OTHER GOVERNMENT

## 2025-06-03 ENCOUNTER — APPOINTMENT (OUTPATIENT)
Dept: CARDIOLOGY | Facility: HOSPITAL | Age: 80
End: 2025-06-03
Payer: OTHER GOVERNMENT

## 2025-06-03 ENCOUNTER — APPOINTMENT (OUTPATIENT)
Dept: CARDIOLOGY | Facility: CLINIC | Age: 80
End: 2025-06-03
Payer: OTHER GOVERNMENT

## 2025-06-11 ENCOUNTER — HOSPITAL ENCOUNTER (EMERGENCY)
Facility: HOSPITAL | Age: 80
Discharge: HOME | End: 2025-06-11
Attending: EMERGENCY MEDICINE
Payer: OTHER GOVERNMENT

## 2025-06-11 ENCOUNTER — APPOINTMENT (OUTPATIENT)
Dept: CARDIOLOGY | Facility: HOSPITAL | Age: 80
End: 2025-06-11
Payer: OTHER GOVERNMENT

## 2025-06-11 ENCOUNTER — APPOINTMENT (OUTPATIENT)
Dept: RADIOLOGY | Facility: HOSPITAL | Age: 80
End: 2025-06-11
Payer: OTHER GOVERNMENT

## 2025-06-11 VITALS
SYSTOLIC BLOOD PRESSURE: 171 MMHG | RESPIRATION RATE: 16 BRPM | DIASTOLIC BLOOD PRESSURE: 88 MMHG | HEART RATE: 60 BPM | HEIGHT: 68 IN | BODY MASS INDEX: 40.47 KG/M2 | TEMPERATURE: 97 F | OXYGEN SATURATION: 97 % | WEIGHT: 267 LBS

## 2025-06-11 DIAGNOSIS — R42 LIGHTHEADEDNESS: Primary | ICD-10-CM

## 2025-06-11 LAB
ALBUMIN SERPL BCP-MCNC: 3.8 G/DL (ref 3.4–5)
ALP SERPL-CCNC: 58 U/L (ref 33–136)
ALT SERPL W P-5'-P-CCNC: 12 U/L (ref 10–52)
ANION GAP SERPL CALC-SCNC: 15 MMOL/L (ref 10–20)
AST SERPL W P-5'-P-CCNC: 18 U/L (ref 9–39)
BASOPHILS # BLD AUTO: 0.02 X10*3/UL (ref 0–0.1)
BASOPHILS NFR BLD AUTO: 0.3 %
BILIRUB SERPL-MCNC: 0.5 MG/DL (ref 0–1.2)
BNP SERPL-MCNC: 91 PG/ML (ref 0–99)
BUN SERPL-MCNC: 34 MG/DL (ref 6–23)
CALCIUM SERPL-MCNC: 9.4 MG/DL (ref 8.6–10.3)
CARDIAC TROPONIN I PNL SERPL HS: 4 NG/L (ref 0–20)
CARDIAC TROPONIN I PNL SERPL HS: 4 NG/L (ref 0–20)
CHLORIDE SERPL-SCNC: 104 MMOL/L (ref 98–107)
CO2 SERPL-SCNC: 26 MMOL/L (ref 21–32)
CREAT SERPL-MCNC: 1.26 MG/DL (ref 0.5–1.3)
EGFRCR SERPLBLD CKD-EPI 2021: 58 ML/MIN/1.73M*2
EOSINOPHIL # BLD AUTO: 0.15 X10*3/UL (ref 0–0.4)
EOSINOPHIL NFR BLD AUTO: 2.2 %
ERYTHROCYTE [DISTWIDTH] IN BLOOD BY AUTOMATED COUNT: 16.2 % (ref 11.5–14.5)
GLUCOSE SERPL-MCNC: 153 MG/DL (ref 74–99)
HCT VFR BLD AUTO: 36.2 % (ref 41–52)
HGB BLD-MCNC: 11.7 G/DL (ref 13.5–17.5)
IMM GRANULOCYTES # BLD AUTO: 0.01 X10*3/UL (ref 0–0.5)
IMM GRANULOCYTES NFR BLD AUTO: 0.1 % (ref 0–0.9)
INR PPP: 1.1 (ref 0.9–1.1)
LYMPHOCYTES # BLD AUTO: 2.17 X10*3/UL (ref 0.8–3)
LYMPHOCYTES NFR BLD AUTO: 32.5 %
MCH RBC QN AUTO: 28.5 PG (ref 26–34)
MCHC RBC AUTO-ENTMCNC: 32.3 G/DL (ref 32–36)
MCV RBC AUTO: 88 FL (ref 80–100)
MONOCYTES # BLD AUTO: 0.5 X10*3/UL (ref 0.05–0.8)
MONOCYTES NFR BLD AUTO: 7.5 %
NEUTROPHILS # BLD AUTO: 3.83 X10*3/UL (ref 1.6–5.5)
NEUTROPHILS NFR BLD AUTO: 57.4 %
NRBC BLD-RTO: 0 /100 WBCS (ref 0–0)
PLATELET # BLD AUTO: 162 X10*3/UL (ref 150–450)
POTASSIUM SERPL-SCNC: 4.2 MMOL/L (ref 3.5–5.3)
PROT SERPL-MCNC: 7.1 G/DL (ref 6.4–8.2)
PROTHROMBIN TIME: 12.5 SECONDS (ref 9.8–12.4)
RBC # BLD AUTO: 4.11 X10*6/UL (ref 4.5–5.9)
SODIUM SERPL-SCNC: 141 MMOL/L (ref 136–145)
WBC # BLD AUTO: 6.7 X10*3/UL (ref 4.4–11.3)

## 2025-06-11 PROCEDURE — 85025 COMPLETE CBC W/AUTO DIFF WBC: CPT | Performed by: EMERGENCY MEDICINE

## 2025-06-11 PROCEDURE — 71045 X-RAY EXAM CHEST 1 VIEW: CPT

## 2025-06-11 PROCEDURE — 84484 ASSAY OF TROPONIN QUANT: CPT | Performed by: EMERGENCY MEDICINE

## 2025-06-11 PROCEDURE — 80053 COMPREHEN METABOLIC PANEL: CPT | Performed by: EMERGENCY MEDICINE

## 2025-06-11 PROCEDURE — 99285 EMERGENCY DEPT VISIT HI MDM: CPT | Mod: 25 | Performed by: EMERGENCY MEDICINE

## 2025-06-11 PROCEDURE — 83880 ASSAY OF NATRIURETIC PEPTIDE: CPT | Performed by: EMERGENCY MEDICINE

## 2025-06-11 PROCEDURE — 85610 PROTHROMBIN TIME: CPT | Performed by: EMERGENCY MEDICINE

## 2025-06-11 PROCEDURE — 93005 ELECTROCARDIOGRAM TRACING: CPT

## 2025-06-11 PROCEDURE — 36415 COLL VENOUS BLD VENIPUNCTURE: CPT | Performed by: EMERGENCY MEDICINE

## 2025-06-11 PROCEDURE — 70450 CT HEAD/BRAIN W/O DYE: CPT

## 2025-06-11 ASSESSMENT — LIFESTYLE VARIABLES
HAVE YOU EVER FELT YOU SHOULD CUT DOWN ON YOUR DRINKING: NO
EVER FELT BAD OR GUILTY ABOUT YOUR DRINKING: NO
EVER HAD A DRINK FIRST THING IN THE MORNING TO STEADY YOUR NERVES TO GET RID OF A HANGOVER: NO
HAVE PEOPLE ANNOYED YOU BY CRITICIZING YOUR DRINKING: NO
TOTAL SCORE: 0

## 2025-06-11 ASSESSMENT — PAIN SCALES - GENERAL
PAINLEVEL_OUTOF10: 0 - NO PAIN
PAINLEVEL_OUTOF10: 8

## 2025-06-11 ASSESSMENT — PAIN - FUNCTIONAL ASSESSMENT: PAIN_FUNCTIONAL_ASSESSMENT: 0-10

## 2025-06-12 LAB
ATRIAL RATE: 67 BPM
ATRIAL RATE: 77 BPM
P AXIS: 32 DEGREES
P OFFSET: 136 MS
P OFFSET: 179 MS
P ONSET: 122 MS
P ONSET: 96 MS
PR INTERVAL: 184 MS
PR INTERVAL: 200 MS
Q ONSET: 188 MS
Q ONSET: 222 MS
QRS COUNT: 11 BEATS
QRS COUNT: 13 BEATS
QRS DURATION: 166 MS
QRS DURATION: 98 MS
QT INTERVAL: 394 MS
QT INTERVAL: 424 MS
QTC CALCULATION(BAZETT): 416 MS
QTC CALCULATION(BAZETT): 479 MS
QTC FREDERICIA: 409 MS
QTC FREDERICIA: 460 MS
R AXIS: -24 DEGREES
R AXIS: 194 DEGREES
T AXIS: 3 DEGREES
T AXIS: 76 DEGREES
T OFFSET: 400 MS
T OFFSET: 419 MS
VENTRICULAR RATE: 67 BPM
VENTRICULAR RATE: 77 BPM

## 2025-06-12 NOTE — DISCHARGE INSTRUCTIONS
You were assessed in the ED for your lightheadedness.  Your labs are normal, you are not having a heart attack. Your scans are normal, you did not have a recent stroke.    You can try taking less gabapentin, it could be causing your dizziness.  Try taking it twice a day instead of 3 times a day.  The fullness is in your sinuses, you can try taking Zyrtec or Claritin.    Please return to the ED if your dizziness gets worse, you are falling due to it, if you have nausea and vomiting due to your dizziness, if you lose control of your bowel and bladder, if you have weakness on only one side of your body, slurred speech, facial droop, if you think you are having a stroke, if you have chest pain, and if you have any other concerns.    Please follow-up with your primary care provider in 1 week to see if the reduced gabapentin helps with the dizziness.

## 2025-06-12 NOTE — ED PROVIDER NOTES
EMERGENCY DEPARTMENT ENCOUNTER      Pt Name: Sanya Crowley  MRN: [id number]  Birthdate 1945  Date of evaluation: 6/11/2025  Provider: Maritza Powell MD    CHIEF COMPLAINT       Chief Complaint   Patient presents with    Dizziness     Pt reports dizziness/lightheadedness for months; [facility] and was told to come to the ER by his MD for further eval. Denies cp or sob.  Pt reports having a pacemaker placed 3 months ago.      HISTORY OF PRESENT ILLNESS    Sanya Crowley is a 79 y.o. year old male who presents to the ER for dizziness.  Patient reports that he has been dizzy for months.  He describes it as lightheadedness and fullness in his frontal and maxillary sinuses.  The patient denies any headaches, visual changes, falls, new onset of clumsiness.  He denies worsening of the dizziness, he says that it has been ongoing since after [employer].     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     PAST MEDICAL HISTORY   Medical History[1]  CURRENT MEDICATIONS       Previous Medications    ACETAMINOPHEN (TYLENOL) 325 MG TABLET    Take 2 tablets (650 mg) by mouth every 6 hours if needed for mild pain (1 - 3). As needed for pacemaker site incision pain    ALBUTEROL 90 MCG/ACTUATION INHALER    Inhale 2 puffs every 6 hours if needed for wheezing.    AMOXICILLIN-POT CLAVULANATE (AUGMENTIN) 875-125 MG TABLET    Take 1 tablet (875 mg) by mouth 2 times a day.    ATORVASTATIN (LIPITOR) 10 MG TABLET    Take 1 tablet (10 mg) by mouth once daily.    CEPHALEXIN (KEFLEX) 500 MG CAPSULE    Take 1 capsule (500 mg) by mouth 4 times a day.    CHOLECALCIFEROL (VITAMIN D-3) 25 MCG (1000 UT) CAPSULE    Take 3 capsules (75 mcg) by mouth once daily.    DESONIDE (DESOWEN) 0.05 % CREAM    Apply topically 2 times a day as needed for irritation. Both ankles    DOXYCYCLINE (ADOXA) 100 MG TABLET    Take 1 tablet (100 mg) by mouth 2 times a day. Take with a full glass of water  and do not lie down for at least 30 minutes after    GABAPENTIN (NEURONTIN) 300 MG CAPSULE    Take 1 capsule (300 mg) by mouth.    IPRATROPIUM (ATROVENT) 42 MCG (0.06 %) NASAL SPRAY    Administer 2 sprays into each nostril 3 times a day as needed for rhinitis.    LUBRICATING EYE DROPS OPHTHALMIC SOLUTION    Administer 1 drop into both eyes 4 times a day as needed for dry eyes.    MULTIVITAMIN WITH MINERALS IRON-FREE (CENTRUM SILVER)    Take 1 tablet by mouth once daily.    TAMSULOSIN (FLOMAX) 0.4 MG 24 HR CAPSULE    Take 1 capsule (0.4 mg) by mouth once daily.    TRAMADOL (ULTRAM) 50 MG TABLET    Take 1 tablet (50 mg) by mouth every 6 hours if needed for moderate pain (4 - 6). If Tylenol not effective    VIT C/E/ZINC OX/KENYON/LUT/ZEAX (ICAPS AREDS2 ORAL)    Take 1 capsule by mouth 2 times a day.     SURGICAL HISTORY     Surgical History[2]  ALLERGIES     Patient has no known allergies.  FAMILY HISTORY     Family History[3]  SOCIAL HISTORY     Social History[4]  PHYSICAL EXAM  (up to 7 for level 4, 8 or more for level 5)     ED Triage Vitals   Temperature Heart Rate Respirations BP   06/11/25 1540 06/11/25 1540 06/11/25 1540 06/11/25 1540   36.4 °C (97.5 °F) 80 16 127/71      Pulse Ox Temp Source Heart Rate Source Patient Position   06/11/25 1540 06/11/25 1700 06/11/25 1700 --   95 % Temporal Monitor       BP Location FiO2 (%)     -- --             Physical Exam  Constitutional:       Appearance: He is normal weight.   HENT:      Head: Normocephalic and atraumatic.      Mouth/Throat:      Mouth: Mucous membranes are moist.   Eyes:      Extraocular Movements: Extraocular movements intact.      Conjunctiva/sclera: Conjunctivae normal.      Pupils: Pupils are equal, round, and reactive to light.   Cardiovascular:      Rate and Rhythm: Normal rate and regular rhythm.      Pulses: Normal pulses.      Heart sounds: Normal heart sounds.   Pulmonary:      Effort: Pulmonary effort is normal.      Breath sounds: Normal breath  sounds.   Abdominal:      General: Abdomen is flat. Bowel sounds are normal.      Palpations: Abdomen is soft.      Tenderness: There is no abdominal tenderness. There is no guarding.      Hernia: No hernia is present.   Musculoskeletal:         General: Normal range of motion.      Cervical back: Normal range of motion and neck supple.      Right lower leg: No edema.      Left lower leg: No edema.   Skin:     General: Skin is warm and dry.   Neurological:      General: No focal deficit present.      Mental Status: He is alert.      Cranial Nerves: No cranial nerve deficit.      Sensory: No sensory deficit.      Motor: No weakness.      Coordination: Coordination normal.      Gait: Gait normal.        DIAGNOSTIC RESULTS   LABS:  Labs Reviewed   CBC WITH AUTO DIFFERENTIAL - Abnormal       Result Value    WBC 6.7      nRBC 0.0      RBC 4.11 (*)     Hemoglobin 11.7 (*)     Hematocrit 36.2 (*)     MCV 88      MCH 28.5      MCHC 32.3      RDW 16.2 (*)     Platelets 162      Neutrophils % 57.4      Immature Granulocytes %, Automated 0.1      Lymphocytes % 32.5      Monocytes % 7.5      Eosinophils % 2.2      Basophils % 0.3      Neutrophils Absolute 3.83      Immature Granulocytes Absolute, Automated 0.01      Lymphocytes Absolute 2.17      Monocytes Absolute 0.50      Eosinophils Absolute 0.15      Basophils Absolute 0.02     COMPREHENSIVE METABOLIC PANEL - Abnormal    Glucose 153 (*)     Sodium 141      Potassium 4.2      Chloride 104      Bicarbonate 26      Anion Gap 15      Urea Nitrogen 34 (*)     Creatinine 1.26      eGFR 58 (*)     Calcium 9.4      Albumin 3.8      Alkaline Phosphatase 58      Total Protein 7.1      AST 18      Bilirubin, Total 0.5      ALT 12     PROTIME-INR - Abnormal    Protime 12.5 (*)     INR 1.1     B-TYPE NATRIURETIC PEPTIDE - Normal    BNP 91      Narrative:        <100 pg/mL - Heart failure unlikely  100-299 pg/mL - Intermediate probability of acute heart                  failure  exacerbation. Correlate with clinical                  context and patient history.    >=300 pg/mL - Heart Failure likely. Correlate with clinical                  context and patient history.    BNP testing is performed using different testing methodology at [facility] than at other [facility]. Direct result comparisons should only be made within the same method.      SERIAL TROPONIN-INITIAL - Normal    Troponin I, High Sensitivity 4      Narrative:     Less than 99th percentile of normal range cutoff-  Female and children under 18 years old <14 ng/L; Male <21 ng/L: Negative  Repeat testing should be performed if clinically indicated.     Female and children under 18 years old 14-50 ng/L; Male 21-50 ng/L:  Consistent with possible cardiac damage and possible increased clinical   risk. Serial measurements may help to assess extent of myocardial damage.     >50 ng/L: Consistent with cardiac damage, increased clinical risk and  myocardial infarction. Serial measurements may help assess extent of   myocardial damage.      NOTE: Children less than 1 year old may have higher baseline troponin   levels and results should be interpreted in conjunction with the overall   clinical context.     NOTE: Troponin I testing is performed using a different   testing methodology at [facility] than at other   [facility]. Direct result comparisons should only   be made within the same method.   SERIAL TROPONIN, 1 HOUR - Normal    Troponin I, High Sensitivity 4      Narrative:     Less than 99th percentile of normal range cutoff-  Female and children under 18 years old <14 ng/L; Male <21 ng/L: Negative  Repeat testing should be performed if clinically indicated.     Female and children under 18 years old 14-50 ng/L; Male 21-50 ng/L:  Consistent with possible cardiac damage and possible increased clinical   risk. Serial measurements may help to assess extent of myocardial damage.     >50 ng/L:  Consistent with cardiac damage, increased clinical risk and  myocardial infarction. Serial measurements may help assess extent of   myocardial damage.      NOTE: Children less than 1 year old may have higher baseline troponin   levels and results should be interpreted in conjunction with the overall   clinical context.     NOTE: Troponin I testing is performed using a different   testing methodology at [facility] than at other   [facility]. Direct result comparisons should only   be made within the same method.   TROPONIN SERIES- (INITIAL, 1 HR)    Narrative:     The following orders were created for panel order Troponin I Series, High Sensitivity (0, 1 HR).  Procedure                               Abnormality         Status                     ---------                               -----------         ------                     Troponin I, High Sensiti...[[id number]]  Normal              Final result               Troponin, High Sensitivi...[[id number]]  Normal              Final result                 Please view results for these tests on the individual orders.     All other labs were within normal range or not returned as of this dictation.  Imaging  CT head wo IV contrast   Final Result   Cerebral atrophy and chronic periventricular white matter small   vessel ischemic change.        No evidence of acute intracranial hemorrhage.        MACRO:   None        Signed by: Miky Campa 6/11/2025 9:43 PM   Dictation workstation:   [id number]      XR chest 1 view   Final Result   1.  No evidence of acute cardiopulmonary process.             Signed by: Junior Walsh 6/11/2025 6:18 PM   Dictation workstation:   [id number]         Procedure  Procedures  EMERGENCY DEPARTMENT COURSE/MDM:   Medical Decision Making    Vitals:    Vitals:    06/11/25 1540 06/11/25 1700 06/11/25 1833 06/11/25 2050   BP: 127/71 126/61 165/81 148/87   Pulse: 80 64 70 65   Resp: 16 16 16 16   Temp: 36.4 °C (97.5 °F) 36.1 °C  "(97 °F)     TempSrc:  Temporal     SpO2: 95% (!) 92% 96% 98%   Weight: 121 kg (267 lb)      Height: 1.727 m (5' 8\")        Sanya Crowley is a male 79 y.o. who presents to the ER for dizziness. On arrival the patients vital signs were: Afebrile, regular heart rate, normotensive, regular respiration rate, normoxic on room air. History obtained from: patient.  The differential diagnoses include MI, pneumonia, stroke, Ménière's disease, vestibular neuritis.    The physical exam significant for cranial nerves II through XII being intact, sensation is intact, coordination is intact, eyes are PERRL, no pain on eye movement, no nystagmus present.  The heart is in regular rate and rhythm, paced.  No murmurs. The lungs are clear to auscultation, no rales, crackles, wheezing.  The abdomen is soft nontender, no masses or hernias appreciated.  Bowel sounds are within normal limits.  The skin is normal, no rashes or lesions.  There is no injury or edema in the lower extremities.    On independent assessment of the labs, CBC was within normal limits, no evidence of infection or acute anemia.  CMP was within normal limits, no evidence of electrolyte abnormalities, JERRY, or liver dysfunction.  Troponins are negative.  BNP is 91.    Chest x-ray is negative for any acute cardiopulmonary processes.  CT of the head showed chronic small vessel ischemic changes, no acute stroke.    This patient presents with dizziness, most consistent with a peripheral cause, likely BPPV. No history of recent infection so doubt vestibular neuritis. History not consistent with meniere's disease. No history of trauma. No red flag features for central vertigo to include gradual onset, vertical/bidirectional or non-fatigable nystagmus, focal neurologic findings on exam (including inability to ambulate, ataxia, dysmetria). Presentation not consistent with an acute CNS infection, vertebral basilar artery insufficiency, cerebellar hemorrhage or infarction, " intracranial mass or bleed.        Diagnoses as of 06/11/25 2213   Lightheadedness       External Records Reviewed: I reviewed recent and relevant outside records including inpatient notes, outpatient records      Shared decision making for disposition  Patient and/or patient´s representative was counseled regarding labs, imaging, likely diagnosis. All questions were answered. Recommendation was made   for discharge home. The patient agreed and was discharged home in stable condition with appropriate relevant educational materials. Return precautions were provided which included worsening headache, vomiting, fever of 38C (100.4) or higher, vision changes, confusion, loss of motion or feeling in your arms or legs, loss of control of your urine or stool, neck pain, fainting, seizure, or any new or worsening symptoms. .     ED Medications administered this visit:  Medications - No data to display    New Prescriptions from this visit:    New Prescriptions    No medications on file       Follow-up:  Paula Hui DO  [address] [place] [id number]  [phone]    In 1 week          Final Impression:   1. Lightheadedness          Please excuse any misspellings or unintended errors related to the Dragon speech recognition software used to dictate this note.    I reviewed the case with the attending ED physician. The attending ED physician agrees with the plan.        [1] History reviewed. No pertinent past medical history.  [2]   Past Surgical History:  Procedure Laterality Date    CARDIAC ELECTROPHYSIOLOGY PROCEDURE Right 2/26/2025    Procedure: PPM IMPLANT DUAL;  Surgeon: Meche Farrell MD;  Location: [facility];  Service: Electrophysiology;  Laterality: Right;   [3] No family history on file.  [4]   Social History  Tobacco Use    Smoking status: Never    Smokeless tobacco: Never   Vaping Use    Vaping status: Never Used   Substance Use Topics    Alcohol use: Not on file    Drug use: Never         Maritza Powell MD  Resident  06/11/25 3348

## 2025-06-30 ENCOUNTER — APPOINTMENT (OUTPATIENT)
Dept: CARDIOLOGY | Facility: CLINIC | Age: 80
End: 2025-06-30
Payer: OTHER GOVERNMENT

## 2025-08-29 ENCOUNTER — HOSPITAL ENCOUNTER (EMERGENCY)
Facility: HOSPITAL | Age: 80
Discharge: HOME | End: 2025-08-29
Attending: EMERGENCY MEDICINE
Payer: OTHER GOVERNMENT

## 2025-08-29 ASSESSMENT — PAIN SCALES - GENERAL: PAINLEVEL_OUTOF10: 0 - NO PAIN

## 2025-08-29 ASSESSMENT — PAIN - FUNCTIONAL ASSESSMENT: PAIN_FUNCTIONAL_ASSESSMENT: 0-10

## (undated) DEVICE — GUIDEWIRE, WHOLEY, 0.035 IN X 145 CM, MODIFIED J/SHAPEABLE TIP

## (undated) DEVICE — ACCESS KIT, MINI MAK, 5FR X 10CM, 0.018 X 40CM, SS/SS, STANDARD NEEDLE

## (undated) DEVICE — GUIDEWIRE, ANGLE TIP,  .035 DIA, 180 CM, 3 CM TIP"

## (undated) DEVICE — AQUA MANTYS, MALLEABLE/LIGHT

## (undated) DEVICE — RETRACTOR, CORDLESS, RADIALUX, LIGHTED

## (undated) DEVICE — DRESSING, AQUACEL AG, HYDROFIBER W/SILVER, 3.5 X 6 IN

## (undated) DEVICE — INTRODUCER, HYDROPHILIC, PRELUDE SNAP, 6 FR X 13 CM, GREEN

## (undated) DEVICE — INTRODUCER, HYDROPHILIC, PRELUDE SNAP, 7 FR X 13 CM, ORANGE

## (undated) DEVICE — HEMOSTAT, ARISTA, ABSORBABLE, 1 GRAMS

## (undated) DEVICE — SHIELD, SCOOP FENESTRATION, SCATPAD, ABSORBENT, DUAL, LEFT SUB/ C

## (undated) DEVICE — WIRE, NITINOL,.018 X 40CM, PLATINUM COIL

## (undated) DEVICE — INTRODUCER, HYDROPHILIC, PRELUDE SNAP, 7FR X 25CM, STERILE

## (undated) DEVICE — HEMOSTAT, D-STAT FLOWABLE

## (undated) DEVICE — WOUND SYSTEM, DEBRIDEMENT & CLEANING, O.R DUOPAK